# Patient Record
Sex: MALE | Race: WHITE | NOT HISPANIC OR LATINO | URBAN - METROPOLITAN AREA
[De-identification: names, ages, dates, MRNs, and addresses within clinical notes are randomized per-mention and may not be internally consistent; named-entity substitution may affect disease eponyms.]

---

## 2021-05-13 ENCOUNTER — TELEPHONE (OUTPATIENT)
Dept: NEUROLOGY | Facility: CLINIC | Age: 71
End: 2021-05-13

## 2021-07-22 ENCOUNTER — OFFICE VISIT (OUTPATIENT)
Dept: NEUROLOGY | Facility: CLINIC | Age: 71
End: 2021-07-22
Payer: MEDICARE

## 2021-07-22 VITALS — DIASTOLIC BLOOD PRESSURE: 89 MMHG | WEIGHT: 201.75 LBS | SYSTOLIC BLOOD PRESSURE: 154 MMHG | HEART RATE: 79 BPM

## 2021-07-22 DIAGNOSIS — G20.A1 PARKINSON'S DISEASE: Primary | ICD-10-CM

## 2021-07-22 PROCEDURE — 99204 OFFICE O/P NEW MOD 45 MIN: CPT | Mod: S$PBB,,, | Performed by: PSYCHIATRY & NEUROLOGY

## 2021-07-22 PROCEDURE — 99999 PR PBB SHADOW E&M-EST. PATIENT-LVL III: CPT | Mod: PBBFAC,,, | Performed by: PSYCHIATRY & NEUROLOGY

## 2021-07-22 PROCEDURE — 99213 OFFICE O/P EST LOW 20 MIN: CPT | Mod: PBBFAC | Performed by: PSYCHIATRY & NEUROLOGY

## 2021-07-22 PROCEDURE — 99204 PR OFFICE/OUTPT VISIT, NEW, LEVL IV, 45-59 MIN: ICD-10-PCS | Mod: S$PBB,,, | Performed by: PSYCHIATRY & NEUROLOGY

## 2021-07-22 PROCEDURE — 99999 PR PBB SHADOW E&M-EST. PATIENT-LVL III: ICD-10-PCS | Mod: PBBFAC,,, | Performed by: PSYCHIATRY & NEUROLOGY

## 2021-07-22 RX ORDER — BENZTROPINE MESYLATE 1 MG/1
1 TABLET ORAL 2 TIMES DAILY
COMMUNITY
Start: 2021-04-20

## 2021-07-22 RX ORDER — SERTRALINE HYDROCHLORIDE 50 MG/1
50 TABLET, FILM COATED ORAL NIGHTLY
COMMUNITY
Start: 2021-05-25 | End: 2022-05-25

## 2021-07-22 RX ORDER — ROPINIROLE 1 MG/1
TABLET, FILM COATED ORAL
COMMUNITY
Start: 2021-04-20

## 2021-07-22 RX ORDER — NAPROXEN SODIUM 220 MG/1
TABLET, FILM COATED ORAL
Status: ON HOLD | COMMUNITY
End: 2022-02-02 | Stop reason: HOSPADM

## 2021-07-22 RX ORDER — CARBIDOPA AND LEVODOPA 25; 100 MG/1; MG/1
TABLET ORAL
COMMUNITY
Start: 2021-05-25

## 2021-08-24 ENCOUNTER — TELEPHONE (OUTPATIENT)
Dept: NEUROSURGERY | Facility: CLINIC | Age: 71
End: 2021-08-24

## 2021-09-16 ENCOUNTER — TELEPHONE (OUTPATIENT)
Dept: NEUROSURGERY | Facility: CLINIC | Age: 71
End: 2021-09-16

## 2021-09-23 ENCOUNTER — OFFICE VISIT (OUTPATIENT)
Dept: NEUROSURGERY | Facility: CLINIC | Age: 71
End: 2021-09-23
Payer: MEDICARE

## 2021-09-23 DIAGNOSIS — G20.A1 PARKINSON'S DISEASE: ICD-10-CM

## 2021-09-23 PROCEDURE — 99205 OFFICE O/P NEW HI 60 MIN: CPT | Mod: 95,,, | Performed by: NEUROLOGICAL SURGERY

## 2021-09-23 PROCEDURE — 99205 PR OFFICE/OUTPT VISIT, NEW, LEVL V, 60-74 MIN: ICD-10-PCS | Mod: 95,,, | Performed by: NEUROLOGICAL SURGERY

## 2021-09-29 ENCOUNTER — OFFICE VISIT (OUTPATIENT)
Dept: NEUROLOGY | Facility: CLINIC | Age: 71
End: 2021-09-29

## 2021-09-29 DIAGNOSIS — F41.9 ANXIETY: ICD-10-CM

## 2021-09-29 DIAGNOSIS — R41.9 COGNITIVE COMPLAINTS: ICD-10-CM

## 2021-09-29 DIAGNOSIS — F32.A DEPRESSION, UNSPECIFIED DEPRESSION TYPE: Primary | ICD-10-CM

## 2021-09-29 DIAGNOSIS — G20.A1 PARKINSON'S DISEASE: ICD-10-CM

## 2021-09-29 PROCEDURE — 99499 NO LOS: ICD-10-PCS | Mod: 95,,, | Performed by: CLINICAL NEUROPSYCHOLOGIST

## 2021-09-29 PROCEDURE — 90791 PSYCH DIAGNOSTIC EVALUATION: CPT | Mod: 95,,, | Performed by: CLINICAL NEUROPSYCHOLOGIST

## 2021-09-29 PROCEDURE — 90791 PR PSYCHIATRIC DIAGNOSTIC EVALUATION: ICD-10-PCS | Mod: 95,,, | Performed by: CLINICAL NEUROPSYCHOLOGIST

## 2021-09-29 PROCEDURE — 99499 UNLISTED E&M SERVICE: CPT | Mod: 95,,, | Performed by: CLINICAL NEUROPSYCHOLOGIST

## 2021-10-04 ENCOUNTER — OFFICE VISIT (OUTPATIENT)
Dept: NEUROLOGY | Facility: CLINIC | Age: 71
End: 2021-10-04

## 2021-10-04 DIAGNOSIS — G31.84 MILD COGNITIVE IMPAIRMENT: Primary | ICD-10-CM

## 2021-10-04 DIAGNOSIS — G20.A1 PARKINSON'S DISEASE: ICD-10-CM

## 2021-10-04 DIAGNOSIS — F41.9 ANXIETY: ICD-10-CM

## 2021-10-04 DIAGNOSIS — F32.A DEPRESSION, UNSPECIFIED DEPRESSION TYPE: ICD-10-CM

## 2021-10-04 PROCEDURE — 99499 UNLISTED E&M SERVICE: CPT | Mod: S$PBB,,, | Performed by: CLINICAL NEUROPSYCHOLOGIST

## 2021-10-04 PROCEDURE — 99999 PR PBB SHADOW E&M-EST. PATIENT-LVL I: CPT | Mod: PBBFAC,,, | Performed by: CLINICAL NEUROPSYCHOLOGIST

## 2021-10-04 PROCEDURE — 99999 PR PBB SHADOW E&M-EST. PATIENT-LVL I: ICD-10-PCS | Mod: PBBFAC,,, | Performed by: CLINICAL NEUROPSYCHOLOGIST

## 2021-10-04 PROCEDURE — 96139 PSYCL/NRPSYC TST TECH EA: CPT | Mod: ,,, | Performed by: CLINICAL NEUROPSYCHOLOGIST

## 2021-10-04 PROCEDURE — 99211 OFF/OP EST MAY X REQ PHY/QHP: CPT | Mod: PBBFAC | Performed by: CLINICAL NEUROPSYCHOLOGIST

## 2021-10-04 PROCEDURE — 96138 PSYCL/NRPSYC TECH 1ST: CPT | Mod: ,,, | Performed by: CLINICAL NEUROPSYCHOLOGIST

## 2021-10-04 PROCEDURE — 99499 NO LOS: ICD-10-PCS | Mod: S$PBB,,, | Performed by: CLINICAL NEUROPSYCHOLOGIST

## 2021-10-04 PROCEDURE — 96133 NRPSYC TST EVAL PHYS/QHP EA: CPT | Mod: ,,, | Performed by: CLINICAL NEUROPSYCHOLOGIST

## 2021-10-04 PROCEDURE — 96132 NRPSYC TST EVAL PHYS/QHP 1ST: CPT | Mod: ,,, | Performed by: CLINICAL NEUROPSYCHOLOGIST

## 2021-10-04 PROCEDURE — 96138 PR PSYCH/NEUROPSYCH TEST ADMIN/SCORING, BY TECH, 2+ TESTS, 1ST 30 MIN: ICD-10-PCS | Mod: ,,, | Performed by: CLINICAL NEUROPSYCHOLOGIST

## 2021-10-04 PROCEDURE — 96139 PR PSYCH/NEUROPSYCH TEST ADMIN/SCORING, BY TECH, 2+ TESTS, EA ADDTL 30 MIN: ICD-10-PCS | Mod: ,,, | Performed by: CLINICAL NEUROPSYCHOLOGIST

## 2021-10-04 PROCEDURE — 96132 PR NEUROPSYCHOLOGIC TEST EVAL SVCS, 1ST HR: ICD-10-PCS | Mod: ,,, | Performed by: CLINICAL NEUROPSYCHOLOGIST

## 2021-10-04 PROCEDURE — 96133 PR NEUROPSYCHOLOGIC TEST EVAL SVCS, EA ADDTL HR: ICD-10-PCS | Mod: ,,, | Performed by: CLINICAL NEUROPSYCHOLOGIST

## 2021-10-07 ENCOUNTER — TELEPHONE (OUTPATIENT)
Dept: NEUROLOGY | Facility: CLINIC | Age: 71
End: 2021-10-07

## 2021-10-07 PROBLEM — G31.84 MILD COGNITIVE IMPAIRMENT: Status: ACTIVE | Noted: 2021-09-29

## 2021-10-08 ENCOUNTER — PATIENT MESSAGE (OUTPATIENT)
Dept: NEUROLOGY | Facility: CLINIC | Age: 71
End: 2021-10-08

## 2021-10-29 ENCOUNTER — TELEPHONE (OUTPATIENT)
Dept: NEUROLOGY | Facility: CLINIC | Age: 71
End: 2021-10-29
Payer: MEDICARE

## 2021-11-15 ENCOUNTER — TELEPHONE (OUTPATIENT)
Dept: NEUROLOGY | Facility: CLINIC | Age: 71
End: 2021-11-15
Payer: MEDICARE

## 2021-11-23 ENCOUNTER — DOCUMENTATION ONLY (OUTPATIENT)
Dept: NEUROLOGY | Facility: CLINIC | Age: 71
End: 2021-11-23
Payer: MEDICARE

## 2021-11-24 ENCOUNTER — TELEPHONE (OUTPATIENT)
Dept: NEUROLOGY | Facility: CLINIC | Age: 71
End: 2021-11-24
Payer: MEDICARE

## 2021-12-08 ENCOUNTER — DOCUMENTATION ONLY (OUTPATIENT)
Dept: NEUROLOGY | Facility: CLINIC | Age: 71
End: 2021-12-08
Payer: MEDICARE

## 2021-12-09 ENCOUNTER — DOCUMENTATION ONLY (OUTPATIENT)
Dept: NEUROLOGY | Facility: CLINIC | Age: 71
End: 2021-12-09
Payer: MEDICARE

## 2021-12-13 ENCOUNTER — TELEPHONE (OUTPATIENT)
Dept: NEUROSURGERY | Facility: CLINIC | Age: 71
End: 2021-12-13

## 2021-12-13 ENCOUNTER — OFFICE VISIT (OUTPATIENT)
Dept: NEUROSURGERY | Facility: CLINIC | Age: 71
End: 2021-12-13
Payer: MEDICARE

## 2021-12-13 VITALS
HEART RATE: 79 BPM | TEMPERATURE: 99 F | BODY MASS INDEX: 30.46 KG/M2 | DIASTOLIC BLOOD PRESSURE: 90 MMHG | HEIGHT: 68 IN | SYSTOLIC BLOOD PRESSURE: 141 MMHG | WEIGHT: 201 LBS

## 2021-12-13 DIAGNOSIS — G20.A1 PARKINSON'S DISEASE: Primary | ICD-10-CM

## 2021-12-13 PROCEDURE — 99214 OFFICE O/P EST MOD 30 MIN: CPT | Mod: S$PBB,,, | Performed by: NEUROLOGICAL SURGERY

## 2021-12-13 PROCEDURE — 99999 PR PBB SHADOW E&M-EST. PATIENT-LVL III: CPT | Mod: PBBFAC,,, | Performed by: NEUROLOGICAL SURGERY

## 2021-12-13 PROCEDURE — 99213 OFFICE O/P EST LOW 20 MIN: CPT | Mod: PBBFAC | Performed by: NEUROLOGICAL SURGERY

## 2021-12-13 PROCEDURE — 99999 PR PBB SHADOW E&M-EST. PATIENT-LVL III: ICD-10-PCS | Mod: PBBFAC,,, | Performed by: NEUROLOGICAL SURGERY

## 2021-12-13 PROCEDURE — 99214 PR OFFICE/OUTPT VISIT, EST, LEVL IV, 30-39 MIN: ICD-10-PCS | Mod: S$PBB,,, | Performed by: NEUROLOGICAL SURGERY

## 2021-12-22 ENCOUNTER — DOCUMENTATION ONLY (OUTPATIENT)
Dept: NEUROLOGY | Facility: CLINIC | Age: 71
End: 2021-12-22
Payer: MEDICARE

## 2022-01-19 ENCOUNTER — HOSPITAL ENCOUNTER (OUTPATIENT)
Dept: RADIOLOGY | Facility: HOSPITAL | Age: 72
Discharge: HOME | End: 2022-01-19
Attending: NEUROLOGICAL SURGERY
Payer: MEDICARE

## 2022-01-19 DIAGNOSIS — G20.A1 PARKINSON'S DISEASE: ICD-10-CM

## 2022-01-19 PROCEDURE — 70553 MRI BRAIN STEM W/O & W/DYE: CPT | Mod: TC

## 2022-01-19 PROCEDURE — 25500020 PHARM REV CODE 255: Performed by: NEUROLOGICAL SURGERY

## 2022-01-19 PROCEDURE — 70553 MRI BRAIN STEM W/O & W/DYE: CPT | Mod: 26,,, | Performed by: RADIOLOGY

## 2022-01-19 PROCEDURE — 70553 MRI BRAIN W WO CONTRAST: ICD-10-PCS | Mod: 26,,, | Performed by: RADIOLOGY

## 2022-01-19 PROCEDURE — A9585 GADOBUTROL INJECTION: HCPCS | Performed by: NEUROLOGICAL SURGERY

## 2022-01-19 RX ORDER — GADOBUTROL 604.72 MG/ML
10 INJECTION INTRAVENOUS
Status: COMPLETED | OUTPATIENT
Start: 2022-01-19 | End: 2022-01-19

## 2022-01-19 RX ADMIN — GADOBUTROL 10 ML: 604.72 INJECTION INTRAVENOUS at 03:01

## 2022-01-31 ENCOUNTER — ANESTHESIA EVENT (OUTPATIENT)
Dept: SURGERY | Facility: HOSPITAL | Age: 72
DRG: 027 | End: 2022-01-31
Payer: MEDICARE

## 2022-01-31 NOTE — H&P (VIEW-ONLY)
[facility] ([facility])  Neurosurgery  Progress Note    Subjective:     History of Present Illness:  Richard Briceno was seen in neurosurgical consultation at the office this morning.  He is a 71-year-old man who first began to note tremor of the left upper extremity at rest about 7 years ago.  This progressed to involve the right upper extremity.  He was seen in neurological evaluation by Dr. Yan in [place] in 2015 and diagnosed with Parkinson's disease.  This has been managed with Sinemet, Requip, and Cogentin but in the last year to the medications have been less effective and he has almost constant resting tremor.  He has noted some warbling jand hypophonia of his voice.  He has experienced stiffness in the upper extremities but no specific difficulties initiating movement and he feels his gait and balance are reasonably good.  He works installing air conditioners on roof tops.  He has had a coronary artery stent placed and remains on aspirin.  He does have some intermittent shortness of breath.  Review of systems is generally otherwise unremarkable.         Post-Op Info:  Procedure(s) (LRB):  INSERTION, FIDUCIAL SCREW, SKULL FOR DBD (N/A)  INSERTION, DEEP BRAIN STIMULATOR (Bilateral)           Medications:  Continuous Infusions:  Scheduled Meds:  PRN Meds:     Review of Systems  Objective:      12 point ROS negative except as specified elsewhere   There is no height or weight on file to calculate BMI.  Vital Signs (Most Recent):    Vital Signs (24h Range):              Past Medical History:   Diagnosis Date    Anxiety     Coronary artery disease     Depression     Hypertension     Shortness of breath           Past Surgical History:   Procedure Laterality Date    CORONARY STENT PLACEMENT  07/2020    TONSILLECTOMY         Social History     Socioeconomic History    Marital status:    Tobacco Use    Smoking status: Never Smoker    Smokeless tobacco: Never Used   Substance and Sexual  Activity    Alcohol use: Yes     Alcohol/week: 50.0 standard drinks     Types: 50 Cans of beer per week         Neurosurgery Physical Exam  On physical examination he is a well-developed, stocky white male who is alert and cooperative.  Examination of the head is unremarkable.  There is a slight delay with extraocular movements but upper gaze is preserved and pupils are equal.  He is moving neck freely.  On neurological examination he is speaking appropriately but as noted has some mild tremor in the voice and hypophonia.  Understanding seems generally good.  He has marked oscillating tremor of both upper extremities at rest worse on the left side.  This is improved with finger-to-nose testing which is reasonably accurate.  There is bilateral cogwheel rigidity of the upper extremities again worse on the left.  Gait is essentially normal.  Cranial nerves otherwise intact.  He has normal facial sensation and movement and the tongue protrudes in midline.  He shows good strength extremities, normal sensation.     Impression:  Parkinson's disease, tremor dominant.     Significant Labs:  No results for input(s): GLU, NA, K, CL, CO2, BUN, CREATININE, CALCIUM, MG in the last 48 hours.  No results for input(s): WBC, HGB, HCT, PLT in the last 48 hours.  No results for input(s): LABPT, INR, APTT in the last 48 hours.  Microbiology Results (last 7 days)     ** No results found for the last 168 hours. **        All pertinent labs from the last 24 hours have been reviewed.    Significant Diagnostics:  I have reviewed all pertinent imaging results/findings within the past 24 hours.    Assessment/Plan:     Parkinson's disease     He will need bilateral implants DBS .  We will plan to start on the right side since his left extremities are more involved and then will either place the left-sided electrode at that time or as a second procedure.  We will plan to get him done the next few weeks            Davis Desai  MD  Neurosurgery  [facility] (2nd Fl)

## 2022-01-31 NOTE — SUBJECTIVE & OBJECTIVE
Interval History:      Richard Briceno was seen in neurosurgical consultation at the office this morning.  He is a 71-year-old man who first began to note tremor of the left upper extremity at rest about 7 years ago.  This progressed to involve the right upper extremity.  He was seen in neurological evaluation by Dr. Yan in [place] in 2015 and diagnosed with Parkinson's disease.  This has been managed with Sinemet, Requip, and Cogentin but in the last year to the medications have been less effective and he has almost constant resting tremor.  He has noted some warbling jand hypophonia of his voice.  He has experienced stiffness in the upper extremities but no specific difficulties initiating movement and he feels his gait and balance are reasonably good.  He works installing air conditioners on roof tops.  He has had a coronary artery stent placed and remains on aspirin.  He does have some intermittent shortness of breath.  Review of systems is generally otherwise unremarkable.          Medications:  Continuous Infusions:  Scheduled Meds:  PRN Meds:     Review of Systems  Objective:        There is no height or weight on file to calculate BMI.  Vital Signs (Most Recent):    Vital Signs (24h Range):                      Neurosurgery Physical Exam  On physical examination he is a well-developed, stocky white male who is alert and cooperative.  Examination of the head is unremarkable.  There is a slight delay with extraocular movements but upper gaze is preserved and pupils are equal.  He is moving neck freely.  On neurological examination he is speaking appropriately but as noted has some mild tremor in the voice and hypophonia.  Understanding seems generally good.  He has marked oscillating tremor of both upper extremities at rest worse on the left side.  This is improved with finger-to-nose testing which is reasonably accurate.  There is bilateral cogwheel rigidity of the upper extremities again worse on  the left.  Gait is essentially normal.  Cranial nerves otherwise intact.  He has normal facial sensation and movement and the tongue protrudes in midline.  He shows good strength extremities, normal sensation.     Impression:  Parkinson's disease, tremor dominant.     Significant Labs:  No results for input(s): GLU, NA, K, CL, CO2, BUN, CREATININE, CALCIUM, MG in the last 48 hours.  No results for input(s): WBC, HGB, HCT, PLT in the last 48 hours.  No results for input(s): LABPT, INR, APTT in the last 48 hours.  Microbiology Results (last 7 days)     ** No results found for the last 168 hours. **        All pertinent labs from the last 24 hours have been reviewed.    Significant Diagnostics:  I have reviewed all pertinent imaging results/findings within the past 24 hours.

## 2022-01-31 NOTE — PROGRESS NOTES
[facility] ([facility])  Neurosurgery  Progress Note    Subjective:     History of Present Illness:  Richard Briceno was seen in neurosurgical consultation at the office this morning.  He is a 71-year-old man who first began to note tremor of the left upper extremity at rest about 7 years ago.  This progressed to involve the right upper extremity.  He was seen in neurological evaluation by Dr. Yan in [place] in 2015 and diagnosed with Parkinson's disease.  This has been managed with Sinemet, Requip, and Cogentin but in the last year to the medications have been less effective and he has almost constant resting tremor.  He has noted some warbling jand hypophonia of his voice.  He has experienced stiffness in the upper extremities but no specific difficulties initiating movement and he feels his gait and balance are reasonably good.  He works installing air conditioners on roof tops.  He has had a coronary artery stent placed and remains on aspirin.  He does have some intermittent shortness of breath.  Review of systems is generally otherwise unremarkable.         Post-Op Info:  Procedure(s) (LRB):  INSERTION, FIDUCIAL SCREW, SKULL FOR DBD (N/A)  INSERTION, DEEP BRAIN STIMULATOR (Bilateral)           Medications:  Continuous Infusions:  Scheduled Meds:  PRN Meds:     Review of Systems  Objective:      12 point ROS negative except as specified elsewhere   There is no height or weight on file to calculate BMI.  Vital Signs (Most Recent):    Vital Signs (24h Range):              Past Medical History:   Diagnosis Date    Anxiety     Coronary artery disease     Depression     Hypertension     Shortness of breath           Past Surgical History:   Procedure Laterality Date    CORONARY STENT PLACEMENT  07/2020    TONSILLECTOMY         Social History     Socioeconomic History    Marital status:    Tobacco Use    Smoking status: Never Smoker    Smokeless tobacco: Never Used   Substance and Sexual  Activity    Alcohol use: Yes     Alcohol/week: 50.0 standard drinks     Types: 50 Cans of beer per week         Neurosurgery Physical Exam  On physical examination he is a well-developed, stocky white male who is alert and cooperative.  Examination of the head is unremarkable.  There is a slight delay with extraocular movements but upper gaze is preserved and pupils are equal.  He is moving neck freely.  On neurological examination he is speaking appropriately but as noted has some mild tremor in the voice and hypophonia.  Understanding seems generally good.  He has marked oscillating tremor of both upper extremities at rest worse on the left side.  This is improved with finger-to-nose testing which is reasonably accurate.  There is bilateral cogwheel rigidity of the upper extremities again worse on the left.  Gait is essentially normal.  Cranial nerves otherwise intact.  He has normal facial sensation and movement and the tongue protrudes in midline.  He shows good strength extremities, normal sensation.     Impression:  Parkinson's disease, tremor dominant.     Significant Labs:  No results for input(s): GLU, NA, K, CL, CO2, BUN, CREATININE, CALCIUM, MG in the last 48 hours.  No results for input(s): WBC, HGB, HCT, PLT in the last 48 hours.  No results for input(s): LABPT, INR, APTT in the last 48 hours.  Microbiology Results (last 7 days)     ** No results found for the last 168 hours. **        All pertinent labs from the last 24 hours have been reviewed.    Significant Diagnostics:  I have reviewed all pertinent imaging results/findings within the past 24 hours.    Assessment/Plan:     Parkinson's disease     He will need bilateral implants DBS .  We will plan to start on the right side since his left extremities are more involved and then will either place the left-sided electrode at that time or as a second procedure.  We will plan to get him done the next few weeks            Davis Desai  MD  Neurosurgery  [facility] (2nd Fl)

## 2022-01-31 NOTE — ASSESSMENT & PLAN NOTE
He will need bilateral implants DBS .  We will plan to start on the right side since his left extremities are more involved and then will either place the left-sided electrode at that time or as a second procedure.  We will plan to get him done the next few weeks

## 2022-01-31 NOTE — HPI
Richard Briceno was seen in neurosurgical consultation at the office this morning.  He is a 71-year-old man who first began to note tremor of the left upper extremity at rest about 7 years ago.  This progressed to involve the right upper extremity.  He was seen in neurological evaluation by Dr. Yan in [place] in 2015 and diagnosed with Parkinson's disease.  This has been managed with Sinemet, Requip, and Cogentin but in the last year to the medications have been less effective and he has almost constant resting tremor.  He has noted some warbling jand hypophonia of his voice.  He has experienced stiffness in the upper extremities but no specific difficulties initiating movement and he feels his gait and balance are reasonably good.  He works installing air conditioners on roof tops.  He has had a coronary artery stent placed and remains on aspirin.  He does have some intermittent shortness of breath.  Review of systems is generally otherwise unremarkable.

## 2022-02-01 ENCOUNTER — ANESTHESIA (OUTPATIENT)
Dept: SURGERY | Facility: HOSPITAL | Age: 72
DRG: 027 | End: 2022-02-01
Payer: MEDICARE

## 2022-02-01 ENCOUNTER — HOSPITAL ENCOUNTER (INPATIENT)
Facility: HOSPITAL | Age: 72
LOS: 1 days | Discharge: HOME | DRG: 027 | End: 2022-02-02
Attending: NEUROLOGICAL SURGERY | Admitting: NEUROLOGICAL SURGERY
Payer: MEDICARE

## 2022-02-01 DIAGNOSIS — G20.A1 PARKINSON'S DISEASE: ICD-10-CM

## 2022-02-01 LAB
ABO + RH BLD: NORMAL
ANION GAP SERPL CALC-SCNC: 7 MMOL/L (ref 8–16)
APTT BLDCRRT: 26.7 SEC (ref 21–32)
BASOPHILS # BLD AUTO: 0.04 K/UL (ref 0–0.2)
BASOPHILS NFR BLD: 0.5 % (ref 0–1.9)
BLD GP AB SCN CELLS X3 SERPL QL: NORMAL
BUN SERPL-MCNC: 20 MG/DL (ref 8–23)
CALCIUM SERPL-MCNC: 9.6 MG/DL (ref 8.7–10.5)
CHLORIDE SERPL-SCNC: 104 MMOL/L (ref 95–110)
CO2 SERPL-SCNC: 28 MMOL/L (ref 23–29)
CREAT SERPL-MCNC: 0.8 MG/DL (ref 0.5–1.4)
CTP QC/QA: YES
DIFFERENTIAL METHOD: ABNORMAL
EOSINOPHIL # BLD AUTO: 0.5 K/UL (ref 0–0.5)
EOSINOPHIL NFR BLD: 6.2 % (ref 0–8)
ERYTHROCYTE [DISTWIDTH] IN BLOOD BY AUTOMATED COUNT: 11.6 % (ref 11.5–14.5)
EST. GFR  (AFRICAN AMERICAN): >60 ML/MIN/1.73 M^2
EST. GFR  (NON AFRICAN AMERICAN): >60 ML/MIN/1.73 M^2
GLUCOSE SERPL-MCNC: 109 MG/DL (ref 70–110)
HCT VFR BLD AUTO: 44.8 % (ref 40–54)
HGB BLD-MCNC: 15.5 G/DL (ref 14–18)
IMM GRANULOCYTES # BLD AUTO: 0.04 K/UL (ref 0–0.04)
IMM GRANULOCYTES NFR BLD AUTO: 0.5 % (ref 0–0.5)
INR PPP: 1.1 (ref 0.8–1.2)
LYMPHOCYTES # BLD AUTO: 3.2 K/UL (ref 1–4.8)
LYMPHOCYTES NFR BLD: 38.9 % (ref 18–48)
MCH RBC QN AUTO: 34.4 PG (ref 27–31)
MCHC RBC AUTO-ENTMCNC: 34.6 G/DL (ref 32–36)
MCV RBC AUTO: 99 FL (ref 82–98)
MONOCYTES # BLD AUTO: 1.3 K/UL (ref 0.3–1)
MONOCYTES NFR BLD: 16 % (ref 4–15)
NEUTROPHILS # BLD AUTO: 3.1 K/UL (ref 1.8–7.7)
NEUTROPHILS NFR BLD: 37.9 % (ref 38–73)
NRBC BLD-RTO: 0 /100 WBC
PLATELET # BLD AUTO: 174 K/UL (ref 150–450)
PMV BLD AUTO: 11 FL (ref 9.2–12.9)
POTASSIUM SERPL-SCNC: 3.9 MMOL/L (ref 3.5–5.1)
PROTHROMBIN TIME: 11.7 SEC (ref 9–12.5)
RBC # BLD AUTO: 4.51 M/UL (ref 4.6–6.2)
SARS-COV-2 AG RESP QL IA.RAPID: NEGATIVE
SODIUM SERPL-SCNC: 139 MMOL/L (ref 136–145)
WBC # BLD AUTO: 8.27 K/UL (ref 3.9–12.7)

## 2022-02-01 PROCEDURE — 36000711: Performed by: NEUROLOGICAL SURGERY

## 2022-02-01 PROCEDURE — 36415 COLL VENOUS BLD VENIPUNCTURE: CPT | Performed by: NEUROLOGICAL SURGERY

## 2022-02-01 PROCEDURE — 94761 N-INVAS EAR/PLS OXIMETRY MLT: CPT

## 2022-02-01 PROCEDURE — C1713 ANCHOR/SCREW BN/BN,TIS/BN: HCPCS | Performed by: NEUROLOGICAL SURGERY

## 2022-02-01 PROCEDURE — 25000003 PHARM REV CODE 250: Performed by: NEUROLOGICAL SURGERY

## 2022-02-01 PROCEDURE — 37000008 HC ANESTHESIA 1ST 15 MINUTES: Performed by: NEUROLOGICAL SURGERY

## 2022-02-01 PROCEDURE — 11000001 HC ACUTE MED/SURG PRIVATE ROOM

## 2022-02-01 PROCEDURE — 63600175 PHARM REV CODE 636 W HCPCS: Performed by: NEUROLOGICAL SURGERY

## 2022-02-01 PROCEDURE — 63600175 PHARM REV CODE 636 W HCPCS: Performed by: STUDENT IN AN ORGANIZED HEALTH CARE EDUCATION/TRAINING PROGRAM

## 2022-02-01 PROCEDURE — D9220A PRA ANESTHESIA: ICD-10-PCS | Mod: ,,, | Performed by: ANESTHESIOLOGY

## 2022-02-01 PROCEDURE — 25000003 PHARM REV CODE 250: Performed by: STUDENT IN AN ORGANIZED HEALTH CARE EDUCATION/TRAINING PROGRAM

## 2022-02-01 PROCEDURE — 27201423 OPTIME MED/SURG SUP & DEVICES STERILE SUPPLY: Performed by: NEUROLOGICAL SURGERY

## 2022-02-01 PROCEDURE — 61867 IMPLANT NEUROELECTRODE: CPT | Mod: 62,,, | Performed by: NEUROLOGICAL SURGERY

## 2022-02-01 PROCEDURE — 61868 IMPLANT NEUROELECTRDE ADDL: CPT | Mod: 62,,, | Performed by: NEUROLOGICAL SURGERY

## 2022-02-01 PROCEDURE — D9220A PRA ANESTHESIA: Mod: ,,, | Performed by: ANESTHESIOLOGY

## 2022-02-01 PROCEDURE — 61868 PR IMPLANT NEUROELECTRDE, ADDÆL: ICD-10-PCS | Mod: 62,,, | Performed by: NEUROLOGICAL SURGERY

## 2022-02-01 PROCEDURE — 85730 THROMBOPLASTIN TIME PARTIAL: CPT | Performed by: STUDENT IN AN ORGANIZED HEALTH CARE EDUCATION/TRAINING PROGRAM

## 2022-02-01 PROCEDURE — 71000016 HC POSTOP RECOV ADDL HR: Performed by: NEUROLOGICAL SURGERY

## 2022-02-01 PROCEDURE — 27000221 HC OXYGEN, UP TO 24 HOURS

## 2022-02-01 PROCEDURE — 85025 COMPLETE CBC W/AUTO DIFF WBC: CPT | Performed by: STUDENT IN AN ORGANIZED HEALTH CARE EDUCATION/TRAINING PROGRAM

## 2022-02-01 PROCEDURE — 86850 RBC ANTIBODY SCREEN: CPT | Performed by: NEUROLOGICAL SURGERY

## 2022-02-01 PROCEDURE — 71000033 HC RECOVERY, INTIAL HOUR: Performed by: NEUROLOGICAL SURGERY

## 2022-02-01 PROCEDURE — 71000015 HC POSTOP RECOV 1ST HR: Performed by: NEUROLOGICAL SURGERY

## 2022-02-01 PROCEDURE — C1778 LEAD, NEUROSTIMULATOR: HCPCS | Performed by: NEUROLOGICAL SURGERY

## 2022-02-01 PROCEDURE — 37000009 HC ANESTHESIA EA ADD 15 MINS: Performed by: NEUROLOGICAL SURGERY

## 2022-02-01 PROCEDURE — 36000710: Performed by: NEUROLOGICAL SURGERY

## 2022-02-01 PROCEDURE — 85610 PROTHROMBIN TIME: CPT | Performed by: STUDENT IN AN ORGANIZED HEALTH CARE EDUCATION/TRAINING PROGRAM

## 2022-02-01 PROCEDURE — 80048 BASIC METABOLIC PNL TOTAL CA: CPT | Performed by: STUDENT IN AN ORGANIZED HEALTH CARE EDUCATION/TRAINING PROGRAM

## 2022-02-01 PROCEDURE — 61867 PR IMPLANT NEUROELECTRODE W/ RECORDING: ICD-10-PCS | Mod: 62,,, | Performed by: NEUROLOGICAL SURGERY

## 2022-02-01 PROCEDURE — A4648 IMPLANTABLE TISSUE MARKER: HCPCS | Performed by: NEUROLOGICAL SURGERY

## 2022-02-01 DEVICE — IMPLANTABLE DEVICE: Type: IMPLANTABLE DEVICE | Site: CRANIAL | Status: FUNCTIONAL

## 2022-02-01 RX ORDER — PROPOFOL 10 MG/ML
VIAL (ML) INTRAVENOUS
Status: DISCONTINUED | OUTPATIENT
Start: 2022-02-01 | End: 2022-02-01

## 2022-02-01 RX ORDER — LABETALOL HYDROCHLORIDE 5 MG/ML
INJECTION, SOLUTION INTRAVENOUS
Status: DISCONTINUED | OUTPATIENT
Start: 2022-02-01 | End: 2022-02-01

## 2022-02-01 RX ORDER — BUPIVACAINE HYDROCHLORIDE AND EPINEPHRINE 5; 5 MG/ML; UG/ML
INJECTION, SOLUTION EPIDURAL; INTRACAUDAL; PERINEURAL
Status: DISCONTINUED | OUTPATIENT
Start: 2022-02-01 | End: 2022-02-01 | Stop reason: HOSPADM

## 2022-02-01 RX ORDER — MUPIROCIN 20 MG/G
OINTMENT TOPICAL
Status: DISCONTINUED | OUTPATIENT
Start: 2022-02-01 | End: 2022-02-01 | Stop reason: HOSPADM

## 2022-02-01 RX ORDER — ONDANSETRON 2 MG/ML
4 INJECTION INTRAMUSCULAR; INTRAVENOUS DAILY PRN
Status: DISCONTINUED | OUTPATIENT
Start: 2022-02-01 | End: 2022-02-01 | Stop reason: HOSPADM

## 2022-02-01 RX ORDER — ROPINIROLE 1 MG/1
1 TABLET, FILM COATED ORAL NIGHTLY
Status: DISCONTINUED | OUTPATIENT
Start: 2022-02-01 | End: 2022-02-02 | Stop reason: HOSPADM

## 2022-02-01 RX ORDER — NICARDIPINE HYDROCHLORIDE 2.5 MG/ML
INJECTION INTRAVENOUS
Status: DISCONTINUED | OUTPATIENT
Start: 2022-02-01 | End: 2022-02-01

## 2022-02-01 RX ORDER — BACITRACIN ZINC 500 UNIT/G
OINTMENT (GRAM) TOPICAL
Status: DISCONTINUED | OUTPATIENT
Start: 2022-02-01 | End: 2022-02-01 | Stop reason: HOSPADM

## 2022-02-01 RX ORDER — MUPIROCIN 20 MG/G
1 OINTMENT TOPICAL 2 TIMES DAILY
Status: DISCONTINUED | OUTPATIENT
Start: 2022-02-01 | End: 2022-02-01 | Stop reason: HOSPADM

## 2022-02-01 RX ORDER — ROPINIROLE 0.5 MG/1
0.5 TABLET, FILM COATED ORAL 2 TIMES DAILY
Status: DISCONTINUED | OUTPATIENT
Start: 2022-02-01 | End: 2022-02-01

## 2022-02-01 RX ORDER — LIDOCAINE HYDROCHLORIDE AND EPINEPHRINE 10; 10 MG/ML; UG/ML
INJECTION, SOLUTION INFILTRATION; PERINEURAL
Status: DISCONTINUED | OUTPATIENT
Start: 2022-02-01 | End: 2022-02-01 | Stop reason: HOSPADM

## 2022-02-01 RX ORDER — ACETAMINOPHEN 325 MG/1
650 TABLET ORAL EVERY 4 HOURS PRN
Status: DISCONTINUED | OUTPATIENT
Start: 2022-02-01 | End: 2022-02-02 | Stop reason: HOSPADM

## 2022-02-01 RX ORDER — FENTANYL CITRATE 50 UG/ML
25 INJECTION, SOLUTION INTRAMUSCULAR; INTRAVENOUS EVERY 5 MIN PRN
Status: DISCONTINUED | OUTPATIENT
Start: 2022-02-01 | End: 2022-02-01 | Stop reason: HOSPADM

## 2022-02-01 RX ORDER — ONDANSETRON 8 MG/1
8 TABLET, ORALLY DISINTEGRATING ORAL EVERY 8 HOURS PRN
Status: DISCONTINUED | OUTPATIENT
Start: 2022-02-01 | End: 2022-02-02 | Stop reason: HOSPADM

## 2022-02-01 RX ORDER — HEPARIN SODIUM 5000 [USP'U]/ML
5000 INJECTION, SOLUTION INTRAVENOUS; SUBCUTANEOUS EVERY 8 HOURS
Status: DISCONTINUED | OUTPATIENT
Start: 2022-02-02 | End: 2022-02-02 | Stop reason: HOSPADM

## 2022-02-01 RX ORDER — NICARDIPINE HYDROCHLORIDE 0.2 MG/ML
INJECTION INTRAVENOUS CONTINUOUS PRN
Status: DISCONTINUED | OUTPATIENT
Start: 2022-02-01 | End: 2022-02-01

## 2022-02-01 RX ORDER — BENZTROPINE MESYLATE 0.5 MG/1
1 TABLET ORAL 2 TIMES DAILY
Status: DISCONTINUED | OUTPATIENT
Start: 2022-02-01 | End: 2022-02-02 | Stop reason: HOSPADM

## 2022-02-01 RX ORDER — PROPOFOL 10 MG/ML
INJECTION, EMULSION INTRAVENOUS CONTINUOUS PRN
Status: DISCONTINUED | OUTPATIENT
Start: 2022-02-01 | End: 2022-02-01

## 2022-02-01 RX ORDER — SERTRALINE HYDROCHLORIDE 50 MG/1
50 TABLET, FILM COATED ORAL DAILY
Status: DISCONTINUED | OUTPATIENT
Start: 2022-02-01 | End: 2022-02-02 | Stop reason: HOSPADM

## 2022-02-01 RX ORDER — ROPINIROLE 0.25 MG/1
0.5 TABLET, FILM COATED ORAL DAILY
Status: DISCONTINUED | OUTPATIENT
Start: 2022-02-02 | End: 2022-02-02 | Stop reason: HOSPADM

## 2022-02-01 RX ORDER — CARBIDOPA AND LEVODOPA 25; 100 MG/1; MG/1
2 TABLET ORAL 3 TIMES DAILY
Status: DISCONTINUED | OUTPATIENT
Start: 2022-02-01 | End: 2022-02-02 | Stop reason: HOSPADM

## 2022-02-01 RX ORDER — PHENYLEPHRINE HYDROCHLORIDE 10 MG/ML
INJECTION INTRAVENOUS
Status: DISCONTINUED | OUTPATIENT
Start: 2022-02-01 | End: 2022-02-01

## 2022-02-01 RX ORDER — CEFTRIAXONE 1 G/1
INJECTION, POWDER, FOR SOLUTION INTRAMUSCULAR; INTRAVENOUS
Status: DISCONTINUED | OUTPATIENT
Start: 2022-02-01 | End: 2022-02-01 | Stop reason: HOSPADM

## 2022-02-01 RX ORDER — CARBIDOPA AND LEVODOPA 25; 100 MG/1; MG/1
2 TABLET ORAL 3 TIMES DAILY
Status: DISCONTINUED | OUTPATIENT
Start: 2022-02-01 | End: 2022-02-01

## 2022-02-01 RX ORDER — OXYCODONE HYDROCHLORIDE 10 MG/1
10 TABLET ORAL EVERY 4 HOURS PRN
Status: DISCONTINUED | OUTPATIENT
Start: 2022-02-01 | End: 2022-02-02 | Stop reason: HOSPADM

## 2022-02-01 RX ADMIN — PROPOFOL 150 MG: 10 INJECTION, EMULSION INTRAVENOUS at 07:02

## 2022-02-01 RX ADMIN — LABETALOL HYDROCHLORIDE 10 MG: 5 INJECTION, SOLUTION INTRAVENOUS at 10:02

## 2022-02-01 RX ADMIN — MUPIROCIN: 20 OINTMENT TOPICAL at 06:02

## 2022-02-01 RX ADMIN — CEFTRIAXONE 2 G: 2 INJECTION, SOLUTION INTRAVENOUS at 10:02

## 2022-02-01 RX ADMIN — PHENYLEPHRINE HYDROCHLORIDE 100 MCG: 10 INJECTION INTRAVENOUS at 08:02

## 2022-02-01 RX ADMIN — PROPOFOL 150 MCG/KG/MIN: 10 INJECTION, EMULSION INTRAVENOUS at 08:02

## 2022-02-01 RX ADMIN — BENZTROPINE MESYLATE 1 MG: 0.5 TABLET ORAL at 08:02

## 2022-02-01 RX ADMIN — PHENYLEPHRINE HYDROCHLORIDE 100 MCG: 10 INJECTION INTRAVENOUS at 09:02

## 2022-02-01 RX ADMIN — PROPOFOL 30 MG: 10 INJECTION, EMULSION INTRAVENOUS at 07:02

## 2022-02-01 RX ADMIN — OXYCODONE HYDROCHLORIDE 10 MG: 10 TABLET ORAL at 08:02

## 2022-02-01 RX ADMIN — PROPOFOL 50 MG: 10 INJECTION, EMULSION INTRAVENOUS at 07:02

## 2022-02-01 RX ADMIN — FENTANYL CITRATE 25 MCG: 50 INJECTION INTRAMUSCULAR; INTRAVENOUS at 01:02

## 2022-02-01 RX ADMIN — CARBIDOPA AND LEVODOPA 2 TABLET: 25; 100 TABLET ORAL at 04:02

## 2022-02-01 RX ADMIN — ROPINIROLE HYDROCHLORIDE 1 MG: 1 TABLET, FILM COATED ORAL at 08:02

## 2022-02-01 RX ADMIN — LABETALOL HYDROCHLORIDE 10 MG: 5 INJECTION, SOLUTION INTRAVENOUS at 09:02

## 2022-02-01 RX ADMIN — NICARDIPINE HYDROCHLORIDE 400 MCG: 2.5 INJECTION INTRAVENOUS at 10:02

## 2022-02-01 RX ADMIN — NICARDIPINE HYDROCHLORIDE 2.5 MG/HR: 0.2 INJECTION, SOLUTION INTRAVENOUS at 10:02

## 2022-02-01 RX ADMIN — PHENYLEPHRINE HYDROCHLORIDE 100 MCG: 10 INJECTION INTRAVENOUS at 10:02

## 2022-02-01 RX ADMIN — REMIFENTANIL HYDROCHLORIDE 0.05 MCG/KG/MIN: 1 INJECTION, POWDER, LYOPHILIZED, FOR SOLUTION INTRAVENOUS at 07:02

## 2022-02-01 NOTE — ANESTHESIA PROCEDURE NOTES
Intubation    Date/Time: 2/1/2022 8:02 AM  Performed by: Serena Sharif MD  Authorized by: Edy Salas MD     Intubation:     Induction:  Intravenous    Intubated:  Postinduction    Mask Ventilation:  Moderately difficult with oral airway    Attempts:  1    Attempted By:  Staff anesthesiologist    Method of Intubation:  Video laryngoscopy    Blade:  [place] 3    Laryngeal View Grade: Grade I - full view of cords      Difficult Airway Encountered?: No      Complications:  None    Airway Device:  Oral endotracheal tube    Airway Device Size:  7.5    Style/Cuff Inflation:  Cuffed    Tube secured:  23    Secured at:  The lips    Placement Verified By:  Capnometry    Complicating Factors:  None    Findings Post-Intubation:  BS equal bilateral

## 2022-02-01 NOTE — NURSING TRANSFER
Nursing Transfer Note      2/1/2022     Reason patient is being transferred: admitted     Transfer To: 960    Transfer via stretcher    Transfer with na    Transported by pct    Medicines sent: na     Any special needs or follow-up needed: CT on the way to his room     Chart send with patient: Yes    Notified: brother

## 2022-02-01 NOTE — PLAN OF CARE
Problem: Adult Inpatient Plan of Care  Goal: Plan of Care Review  Outcome: Ongoing, Progressing  Flowsheets (Taken 2/1/2022 1741)  Plan of Care Reviewed With:   patient   family  Goal: Patient-Specific Goal (Individualized)  Outcome: Ongoing, Progressing  Flowsheets (Taken 2/1/2022 1741)  Anxieties, Fears or Concerns: none  Individualized Care Needs: brother at bedside  Patient-Specific Goals (Include Timeframe): none     POC reviewed with patient. Patient verbalized understanding of POC. All comments and concerns addressed. Bed locked and low. Bed alarm set, Call light within reach. Safety precautions maintained. Will continue to monitor for changes to poc and clinical conditions.

## 2022-02-01 NOTE — ANESTHESIA PREPROCEDURE EVALUATION
[facility]  Anesthesia Pre-Operative Evaluation         Patient Name: Richard Briceno Sr.  YOB: 1950  MRN: [id number]    SUBJECTIVE:     Pre-operative evaluation for Procedure(s) (LRB):  INSERTION, FIDUCIAL SCREW, SKULL FOR DBD (N/A)  INSERTION, DEEP BRAIN STIMULATOR (Bilateral)     02/01/2022    Richard Briceno Sr. is a 71 y.o. male w/ a significant PMHx of .    Patient now presents for the above procedure(s).      LDA: None documented.       Peripheral IV - Single Lumen 02/01/22 0559 18 G Anterior;Left Forearm (Active)   Site Assessment Clean;Dry;Intact;No redness;No swelling 02/01/22 0559   Extremity Assessment Distal to IV No warmth;No swelling;No redness;No abnormal discoloration 02/01/22 0559   Line Status Blood return noted;Flushed;Infusing 02/01/22 0559   Dressing Status Clean;Dry;Intact 02/01/22 0559   Dressing Intervention First dressing 02/01/22 0559   Number of days: 0        Prev airway: None documented.    Drips: None documented.       Patient Active Problem List   Diagnosis    Parkinson's disease    Mild cognitive impairment    Anxiety    Depression       Review of patient's allergies indicates:  No Known Allergies    Current Outpatient Medications:    Current Facility-Administered Medications:     cefTRIAXone (ROCEPHIN) 2 g/50 mL D5W IVPB, 2 g, Intravenous, On Call Procedure, Davis Desai MD    mupirocin 2 % ointment 1 g, 1 g, Nasal, BID, Davis Desai MD    mupirocin 2 % ointment, , Nasal, On Call Procedure, Davis Desai MD, Given at 02/01/22 0602    Past Surgical History:   Procedure Laterality Date    CORONARY STENT PLACEMENT  07/2020    TONSILLECTOMY         Social History     Socioeconomic History    Marital status:    Tobacco Use    Smoking status: Never Smoker    Smokeless tobacco: Never Used   Substance and Sexual Activity    Alcohol use: Yes     Alcohol/week: 50.0 standard drinks     Types: 50 Cans of beer per week       OBJECTIVE:      Vital Signs Range (Last 24H):  Temp:  [37 °C (98.6 °F)]   Pulse:  [81]   Resp:  [17]   BP: (148)/(85)   SpO2:  [96 %]       Significant Labs:  No results found for: WBC, HGB, HCT, PLT, CHOL, TRIG, HDL, LDLDIRECT, ALT, AST, NA, K, CL, CREATININE, BUN, CO2, TSH, PSA, INR, GLUF, HGBA1C, MICROALBUR    Diagnostic Studies: No relevant studies.    EKG:   No results found for this or any previous visit.    2D ECHO:  TTE:  No results found for this or any previous visit.    GAURAV:  No results found for this or any previous visit.    ASSESSMENT/PLAN:         Anesthesia Evaluation    I have reviewed the Patient Summary Reports.      I have reviewed the Medications.     Review of Systems  Cardiovascular:   Hypertension CAD      Pulmonary:  Pulmonary Normal    Neurological:   Neuromuscular Disease,    Psych:   Psychiatric History          Physical Exam  General:  Well nourished, Obesity    Airway/Jaw/Neck:  Airway Findings: Mallampati: II      Chest/Lungs:  Chest/Lungs Findings: Clear to auscultation     Heart/Vascular:  Heart Findings: Rate: Normal  Rhythm: Regular Rhythm        Mental Status:  Mental Status Findings:  Alert and Oriented         Anesthesia Plan  Type of Anesthesia, risks & benefits discussed:  Anesthesia Type:  MAC    Patient's Preference:   Plan Factors:          Intra-op Monitoring Plan: standard ASA monitors  Intra-op Monitoring Plan Comments:   Post Op Pain Control Plan: multimodal analgesia, IV/PO Opioids PRN and per primary service following discharge from PACU  Post Op Pain Control Plan Comments:     Induction:   IV  Beta Blocker:         Informed Consent: Patient understands risks and agrees with Anesthesia plan.  Questions answered. Anesthesia consent signed with patient.  ASA Score: 3     Day of Surgery Review of History & Physical:            Ready For Surgery From Anesthesia Perspective.

## 2022-02-01 NOTE — PROGRESS NOTES
Patients home med administered per brother per Dr. Carl request. X-ray at bedside completing imaging. NAD noted. VSS. Safety maintained.

## 2022-02-02 VITALS
WEIGHT: 201.06 LBS | HEIGHT: 68 IN | OXYGEN SATURATION: 94 % | TEMPERATURE: 100 F | HEART RATE: 74 BPM | DIASTOLIC BLOOD PRESSURE: 66 MMHG | SYSTOLIC BLOOD PRESSURE: 125 MMHG | BODY MASS INDEX: 30.47 KG/M2 | RESPIRATION RATE: 18 BRPM

## 2022-02-02 LAB
ANION GAP SERPL CALC-SCNC: 6 MMOL/L (ref 8–16)
BASOPHILS # BLD AUTO: 0.02 K/UL (ref 0–0.2)
BASOPHILS NFR BLD: 0.2 % (ref 0–1.9)
BUN SERPL-MCNC: 15 MG/DL (ref 8–23)
CALCIUM SERPL-MCNC: 8.8 MG/DL (ref 8.7–10.5)
CHLORIDE SERPL-SCNC: 103 MMOL/L (ref 95–110)
CO2 SERPL-SCNC: 28 MMOL/L (ref 23–29)
CREAT SERPL-MCNC: 0.7 MG/DL (ref 0.5–1.4)
DIFFERENTIAL METHOD: ABNORMAL
EOSINOPHIL # BLD AUTO: 0.1 K/UL (ref 0–0.5)
EOSINOPHIL NFR BLD: 0.8 % (ref 0–8)
ERYTHROCYTE [DISTWIDTH] IN BLOOD BY AUTOMATED COUNT: 11.4 % (ref 11.5–14.5)
EST. GFR  (AFRICAN AMERICAN): >60 ML/MIN/1.73 M^2
EST. GFR  (NON AFRICAN AMERICAN): >60 ML/MIN/1.73 M^2
GLUCOSE SERPL-MCNC: 138 MG/DL (ref 70–110)
HCT VFR BLD AUTO: 39.6 % (ref 40–54)
HGB BLD-MCNC: 13.6 G/DL (ref 14–18)
IMM GRANULOCYTES # BLD AUTO: 0.03 K/UL (ref 0–0.04)
IMM GRANULOCYTES NFR BLD AUTO: 0.3 % (ref 0–0.5)
LYMPHOCYTES # BLD AUTO: 1.8 K/UL (ref 1–4.8)
LYMPHOCYTES NFR BLD: 16.8 % (ref 18–48)
MAGNESIUM SERPL-MCNC: 1.8 MG/DL (ref 1.6–2.6)
MCH RBC QN AUTO: 33.7 PG (ref 27–31)
MCHC RBC AUTO-ENTMCNC: 34.3 G/DL (ref 32–36)
MCV RBC AUTO: 98 FL (ref 82–98)
MONOCYTES # BLD AUTO: 1.7 K/UL (ref 0.3–1)
MONOCYTES NFR BLD: 15.6 % (ref 4–15)
NEUTROPHILS # BLD AUTO: 7 K/UL (ref 1.8–7.7)
NEUTROPHILS NFR BLD: 66.3 % (ref 38–73)
NRBC BLD-RTO: 0 /100 WBC
PHOSPHATE SERPL-MCNC: 3.1 MG/DL (ref 2.7–4.5)
PLATELET # BLD AUTO: 140 K/UL (ref 150–450)
PMV BLD AUTO: 11 FL (ref 9.2–12.9)
POTASSIUM SERPL-SCNC: 3.8 MMOL/L (ref 3.5–5.1)
RBC # BLD AUTO: 4.04 M/UL (ref 4.6–6.2)
SODIUM SERPL-SCNC: 137 MMOL/L (ref 136–145)
WBC # BLD AUTO: 10.61 K/UL (ref 3.9–12.7)

## 2022-02-02 PROCEDURE — 97165 OT EVAL LOW COMPLEX 30 MIN: CPT

## 2022-02-02 PROCEDURE — 25000003 PHARM REV CODE 250: Performed by: STUDENT IN AN ORGANIZED HEALTH CARE EDUCATION/TRAINING PROGRAM

## 2022-02-02 PROCEDURE — 84100 ASSAY OF PHOSPHORUS: CPT | Performed by: STUDENT IN AN ORGANIZED HEALTH CARE EDUCATION/TRAINING PROGRAM

## 2022-02-02 PROCEDURE — 99024 PR POST-OP FOLLOW-UP VISIT: ICD-10-PCS | Mod: POP,,, | Performed by: PHYSICIAN ASSISTANT

## 2022-02-02 PROCEDURE — 80048 BASIC METABOLIC PNL TOTAL CA: CPT | Performed by: STUDENT IN AN ORGANIZED HEALTH CARE EDUCATION/TRAINING PROGRAM

## 2022-02-02 PROCEDURE — 25000242 PHARM REV CODE 250 ALT 637 W/ HCPCS: Performed by: PHYSICIAN ASSISTANT

## 2022-02-02 PROCEDURE — 83735 ASSAY OF MAGNESIUM: CPT | Performed by: STUDENT IN AN ORGANIZED HEALTH CARE EDUCATION/TRAINING PROGRAM

## 2022-02-02 PROCEDURE — 63600175 PHARM REV CODE 636 W HCPCS: Performed by: STUDENT IN AN ORGANIZED HEALTH CARE EDUCATION/TRAINING PROGRAM

## 2022-02-02 PROCEDURE — 97162 PT EVAL MOD COMPLEX 30 MIN: CPT

## 2022-02-02 PROCEDURE — 99024 POSTOP FOLLOW-UP VISIT: CPT | Mod: POP,,, | Performed by: PHYSICIAN ASSISTANT

## 2022-02-02 PROCEDURE — 94761 N-INVAS EAR/PLS OXIMETRY MLT: CPT

## 2022-02-02 PROCEDURE — 94640 AIRWAY INHALATION TREATMENT: CPT

## 2022-02-02 PROCEDURE — 36415 COLL VENOUS BLD VENIPUNCTURE: CPT | Performed by: STUDENT IN AN ORGANIZED HEALTH CARE EDUCATION/TRAINING PROGRAM

## 2022-02-02 PROCEDURE — 85025 COMPLETE CBC W/AUTO DIFF WBC: CPT | Performed by: STUDENT IN AN ORGANIZED HEALTH CARE EDUCATION/TRAINING PROGRAM

## 2022-02-02 PROCEDURE — 97530 THERAPEUTIC ACTIVITIES: CPT

## 2022-02-02 PROCEDURE — 97110 THERAPEUTIC EXERCISES: CPT

## 2022-02-02 RX ORDER — OXYCODONE HYDROCHLORIDE 5 MG/1
5 TABLET ORAL EVERY 8 HOURS PRN
Qty: 21 TABLET | Refills: 0 | Status: SHIPPED | OUTPATIENT
Start: 2022-02-02 | End: 2022-02-09

## 2022-02-02 RX ORDER — SULFAMETHOXAZOLE AND TRIMETHOPRIM 800; 160 MG/1; MG/1
1 TABLET ORAL 2 TIMES DAILY
Qty: 10 TABLET | Refills: 0 | Status: SHIPPED | OUTPATIENT
Start: 2022-02-02 | End: 2022-02-07

## 2022-02-02 RX ORDER — ALBUMIN HUMAN 250 G/1000ML
SOLUTION INTRAVENOUS
Status: DISCONTINUED
Start: 2022-02-02 | End: 2022-02-02 | Stop reason: HOSPADM

## 2022-02-02 RX ORDER — OXYCODONE HYDROCHLORIDE 10 MG/1
10 TABLET ORAL EVERY 6 HOURS PRN
Qty: 28 TABLET | Refills: 0 | Status: SHIPPED | OUTPATIENT
Start: 2022-02-02 | End: 2022-02-02 | Stop reason: HOSPADM

## 2022-02-02 RX ORDER — IPRATROPIUM BROMIDE AND ALBUTEROL SULFATE 2.5; .5 MG/3ML; MG/3ML
3 SOLUTION RESPIRATORY (INHALATION) EVERY 6 HOURS
Status: DISCONTINUED | OUTPATIENT
Start: 2022-02-02 | End: 2022-02-02 | Stop reason: HOSPADM

## 2022-02-02 RX ADMIN — SERTRALINE HYDROCHLORIDE 50 MG: 50 TABLET ORAL at 08:02

## 2022-02-02 RX ADMIN — IPRATROPIUM BROMIDE AND ALBUTEROL SULFATE 3 ML: 2.5; .5 SOLUTION RESPIRATORY (INHALATION) at 12:02

## 2022-02-02 RX ADMIN — CARBIDOPA AND LEVODOPA 2 TABLET: 25; 100 TABLET ORAL at 08:02

## 2022-02-02 RX ADMIN — HEPARIN SODIUM 5000 UNITS: 5000 INJECTION, SOLUTION INTRAVENOUS; SUBCUTANEOUS at 05:02

## 2022-02-02 RX ADMIN — CARBIDOPA AND LEVODOPA 2 TABLET: 25; 100 TABLET ORAL at 02:02

## 2022-02-02 RX ADMIN — ROPINIROLE HYDROCHLORIDE 0.5 MG: 0.25 TABLET, FILM COATED ORAL at 08:02

## 2022-02-02 RX ADMIN — BENZTROPINE MESYLATE 1 MG: 0.5 TABLET ORAL at 08:02

## 2022-02-02 RX ADMIN — CARBIDOPA AND LEVODOPA 1 SPLIT TABLET: 25; 100 TABLET ORAL at 02:02

## 2022-02-02 NOTE — NURSING
Patient discharged with ordered medications from pharmacy.  Discharge paperwork given/explained to brother who will also transport patient home. IV discontinued. Care relinquished.

## 2022-02-02 NOTE — PLAN OF CARE
[facility] (Hospital)  Discharge Assessment    Primary Care Provider: Primary Doctor No     Discharge Assessment (most recent)     BRIEF DISCHARGE ASSESSMENT - 02/02/22 8898        Discharge Planning    Assessment Type Discharge Planning Brief Assessment     Resource/Environmental Concerns none     Support Systems Spouse/significant other     Equipment Currently Used at Home rollator     Current Living Arrangements home/apartment/condo     Patient/Family Anticipates Transition to home with family     Patient/Family Anticipated Services at Transition none     DME Needed Upon Discharge  none     Discharge Plan A Home with family     Discharge Plan B Home with family;Home Health

## 2022-02-02 NOTE — PLAN OF CARE
Problem: Adult Inpatient Plan of Care  Goal: Plan of Care Review  Outcome: Ongoing, Progressing  Goal: Patient-Specific Goal (Individualized)  Outcome: Ongoing, Progressing  Goal: Absence of Hospital-Acquired Illness or Injury  Outcome: Ongoing, Progressing  Goal: Optimal Comfort and Wellbeing  Outcome: Ongoing, Progressing  Goal: Readiness for Transition of Care  Outcome: Ongoing, Progressing   POC reviewed and updated with the patient. Questions regarding POC were encouraged and addressed with the patient.  VSS, see flow-sheets. Patient is AO X *4 at this time. Fall/safety precautions maintained, no signs of injury noted during shift. Patient was repositioned for comfort, bed locked in low position with side rails X *3, bed alarm set, with call light within reach. Patient instructed to call staff for mobility, verbalized understanding. No acute signs of distress noted at this time.

## 2022-02-02 NOTE — ANESTHESIA POSTPROCEDURE EVALUATION
Anesthesia Post Evaluation    Patient: Richard Briceno Sr.    Procedure(s) Performed: Procedure(s) (LRB):  INSERTION, FIDUCIAL SCREW, SKULL FOR DBD (N/A)  INSERTION,DEEP BRAIN STIMULATOR,LEAD ONLY (Bilateral)    Final Anesthesia Type: general      Patient location during evaluation: PACU  Patient participation: Yes- Able to Participate  Level of consciousness: awake and alert and oriented  Post-procedure vital signs: reviewed and stable  Pain management: adequate  Airway patency: patent    PONV status at discharge: No PONV  Anesthetic complications: no      Cardiovascular status: hemodynamically stable  Respiratory status: unassisted, spontaneous ventilation and room air  Hydration status: euvolemic  Follow-up not needed.          Vitals Value Taken Time   /65 02/01/22 1532   Temp 36.7 °C (98.1 °F) 02/01/22 1415   Pulse 71 02/01/22 1537   Resp 18 02/01/22 1537   SpO2 96 % 02/01/22 1537   Vitals shown include unvalidated device data.      Event Time   Out of Recovery 13:45:00         Pain/Bharath Score: Pain Rating Prior to Med Admin: 7 (2/1/2022  8:51 PM)  Bharath Score: 9 (2/1/2022  1:45 PM)

## 2022-02-02 NOTE — PLAN OF CARE
POC established and functional mobility goals were created to help pt return to PLOF. Will be reassessed as appropriate to measure pt progress.    Problem: Physical Therapy Goal  Goal: Physical Therapy Goal  Description: Goals to be met by: 22     Patient will increase functional independence with mobility by performin. Supine to sit with MInimal Assistance  2. Sit to supine with MInimal Assistance  3. Sit to stand transfer with Minimal Assistance  4. Bed to chair transfer with Minimal Assistance using LRAD  5. Gait  x 30 feet with Minimal Assistance using LRAD   6. Ascend/descend 1 stair with Minimal Assistance using LRAD.   7. Lower extremity exercise program x15 reps per handout, with independence    Outcome: Ongoing, Progressing

## 2022-02-02 NOTE — DISCHARGE INSTRUCTIONS
Neurosurgery Patient Information      -No driving until cleared in your post-operative appointment. No driving while on narcotics.   -Do not take any OTC products containing acetaminophen at the same time as you take your narcotic pain medication. Medications that may contain acetaminophen include but are not limited to: Excedrin and other headache medications, arthritis medications, cold and sinus medications, etc. Please review the list of active ingredients in any OTC medication prior to taking it.  -Continue to hold your Aspirin.  -Do not take any Aleve, Naprosyn, Naproxen, Ibuprofen, Advil or any other nonsteroidal anti-inflammatory drug (NSAID) for 2 weeks after surgery.  -Do not take any herbal supplements for 2 weeks after surgery.   -Do not consume any alcoholic beverages until released by your neurosurgeon  -Do not perform any excessive bending over or leaning forward as this is a fall hazard.  -Do not lift anything heavier than a gallon of milk until cleared in post-operative visit.     Contact the Neurosurgery Office immediately if:  If you begin to notice any neurologic changes such as:           -Sudden onset of lethargy or sleepiness           -Sudden confusion, trouble speaking, or understanding            -Sudden trouble seeing in one or both eyes            -Sudden trouble walking, dizziness, loss of coordination            -Sudden severe headache with no known cause            -Sudden onset of numbness or weakness       Wound Care:  Keep your incision open to air. You may shower on the 2nd day after your surgery. Please shower with baby shampoo, but do not take a bath. Keep the incision clean and dry at all times. Please cover the incision with saran wrap or other occlusive dressing while showering and REMOVE once you have completed taking your shower. Do not allow the force of water to hit the incision. If the incision gets damp, gently pat it dry. Do not rub or scrub the incision. You cannot  take a bath/swim/submerge the incision until 8 weeks after surgery.    The incision does not need to be cleaned with any water, soap, alcohol, peroxide, or other substance.    Please apply bacitracin ointment to incisions twice daily. You have staples in place. They will need to be removed.     You now have an implanted device in place. It is imperative that any infection (such as a urinary tract infection) be treated immediately so that it cannot get into your bloodstream. If an infection ends up in your blood, it may seed the device, thus requiring us to remove it. Call the Neurosurgery office or go to the Emergency Room for any signs of infection including: increased redness, drainage, pain or fever (temperature greater than or equal to 101.4).     Miscellaneous:  -Follow up in 1 week for placement of DBS battery placement.  -Take antibiotics as prescribed  -Use incentive spirometer as directed.    Neurosurgery Office: [phone]

## 2022-02-02 NOTE — PLAN OF CARE
Problem: Occupational Therapy Goal  Goal: Occupational Therapy Goal  Description: Goals to be met by: 2/16/2022    Patient will increase functional independence with ADLs by performing:    UE Dressing with Stand-by Assistance.  LE Dressing with Minimal Assistance.  Grooming while EOB with Stand-by Assistance.  Toileting from bedside commode with Minimal Assistance for hygiene and clothing management.   Supine to sit with Minimal Assistance.  Sit to stand transfer with Minimal Assistance with LRAD.  Toilet transfer to bedside commode with Minimal Assistance with LRAD.    Outcome: Ongoing, Progressing     Pt evaluated and OT goals established.

## 2022-02-02 NOTE — PT/OT/SLP EVAL
Occupational Therapy   Evaluation and Treatment    Name: Richard Briceno Sr.  MRN: [id number]  Admitting Diagnosis:  Parkinson's disease  Recent Surgery: Procedure(s) (LRB):  INSERTION, FIDUCIAL SCREW, SKULL FOR DBD (N/A)  INSERTION,DEEP BRAIN STIMULATOR,LEAD ONLY (Bilateral) 1 Day Post-Op     Co-eval with PT to have 2 skilled therapists present to safely assess pt's functional mobility.    Recommendations:     Discharge Recommendations: nursing facility, skilled  Discharge Equipment Recommendations:  bedside commode,other (see comments) (TBD)  Barriers to discharge:  Other (Comment) (increased level of assistance required)    Assessment:     Richard Briceno Sr. is a 71 y.o. male with a medical diagnosis of Parkinson's disease.  Pt had limited tolerance of session due to his somnolent state.  He requires significant assistance to perform ADLs and functional mobility.  Due to his current level of function, SNF recommended at d/c for maximal pt gains in functional independence and to ensure pt's safety upon returning home.   He presents with the following deficits. Performance deficits affecting function: weakness,impaired endurance,impaired self care skills,impaired functional mobilty,gait instability,impaired balance,decreased coordination,impaired fine motor,impaired coordination,decreased upper extremity function,decreased lower extremity function,decreased ROM.      Rehab Prognosis: Good; patient would benefit from acute skilled OT services to address these deficits and reach maximum level of function.       Plan:     Patient to be seen 4 x/week to address the above listed problems via self-care/home management,therapeutic exercises,therapeutic activities,neuromuscular re-education  · Plan of Care Expires: 03/01/22  · Plan of Care Reviewed with: patient,sibling    Subjective     Chief Complaint: none verbalized - pt was somnolent and non-verbal for most of the session  Patient/Family Comments/goals: Per pt's  brother, to bring the pt home.    Occupational Profile: (obtained from pt's brother due to pt being somnolent)  Living Environment: Pt lives with his wife and 2 adult children in a 2  with a threshold to enter.  He was living on the 2nd floor, but pt's brother said his family is arranging a room for him on the 1st floor.  Pt's brother was unsure whether or not there's a full bathroom on the 1st floor.  Previous level of function: Independent with functional mobility, but he had difficulty performing daily tasks due to UE/LE weakness.    Roles and Routines: , father; per chart, pt used to install A/C units on rooftops   Equipment Used at Home:  other (see comments) (pt's brother is unsure, but he said he doesn't remember his brother using DME to ambulate)  Assistance upon Discharge: His family, primarily his wife and son    Pain/Comfort:  · Pain Rating 1: 0/10  · Pain Rating Post-Intervention 1: 0/10    Patients cultural, spiritual, Taoism conflicts given the current situation: no    Objective:     Communicated with: nursing and PT prior to session.  Patient found HOB elevated with bed alarm with his brother, Uriel, present iupon OT entry to room.    General Precautions: Standard, fall   Orthopedic Precautions:N/A   Braces: N/A  Respiratory Status: Room air    Occupational Performance:    Bed Mobility:    · Patient completed Rolling/Turning to Left with  maximal assistance and 2 persons  · Patient completed Scooting/Bridging to EOB and to HOB with maximal assistance and 2 persons  · Patient completed Supine to Sit with maximal assistance and 2 persons    Functional Mobility/Transfers:  · Patient completed Sit <> Stand Transfer from EOB x 2 trials with maximal assistance of 2 persons with bilateral hand-held assist for 1st trial and with Mod A x 2 with B HHA for the 2nd trial  · Functional Mobility: Pt not safe to ambulate this date due to pt's somnolent state and significant posterior lean while standing.       Activities of Daily Living:  · Unable to formally assess due to pt's somnolent state, but based on writing therapist's clinical judgment he would require Max-Total Assistance for LBD, toileting, and bathing.     Cognitive/Visual Perceptual:  Cognitive/Psychosocial Skills:     -       Oriented to: Person   -       Follows Commands/attention:Limited command following due to lethargy  -       Communication: Low volume and intermittent garbled speech  -       Mood/Affect/Coping skills/emotional control: Cooperative and Lethargic    Physical Exam:  Upper Extremity Range of Motion:     -       Right Upper Extremity: WFL except AROM shoulder flexion less than 90 degrees; PROM WFL  -       Left Upper Extremity: WFL except AROM shoulder flexion less than 90 degrees; PROM WFL  Upper Extremity Strength:    -       Right Upper Extremity: difficult to assess due to pt's lethargic state  -       Left Upper Extremity:  difficult to assess due to pt's lethargic state   Strength:    -       Right Upper Extremity: moderate composite grasp  -       Left Upper Extremity: moderate composite grasp  Fine Motor Coordination:    -       Impaired  Left hand, finger to nose , Right hand, finger to nose , Left hand thumb/finger opposition skills, and Right hand thumb/finger opposition skills (possibly due to his lethargic state)    AMPAC 6 Click ADL:  AMPAC Total Score: 12    Treatment & Education:  Pt and his brother edu on role of OT, POC, benefit of performing EOB/OOB activity, and safety when performing functional transfers and mobility.    - Pt initially required Min A for static sitting balance EOB but progressed to SBA    Education:    Patient left HOB elevated with all lines intact, call button in reach and bed alarm on    GOALS:   Multidisciplinary Problems     Occupational Therapy Goals        Problem: Occupational Therapy Goal    Goal Priority Disciplines Outcome Interventions   Occupational Therapy Goal     OT, PT/OT  Ongoing, Progressing    Description: Goals to be met by: 2/16/2022    Patient will increase functional independence with ADLs by performing:    UE Dressing with Stand-by Assistance.  LE Dressing with Minimal Assistance.  Grooming while EOB with Stand-by Assistance.  Toileting from bedside commode with Minimal Assistance for hygiene and clothing management.   Supine to sit with Minimal Assistance.  Sit to stand transfer with Minimal Assistance with LRAD.  Toilet transfer to bedside commode with Minimal Assistance with LRAD.                     History:     Past Medical History:   Diagnosis Date    Anxiety     Coronary artery disease     Depression     Hypertension     Shortness of breath        Past Surgical History:   Procedure Laterality Date    CORONARY STENT PLACEMENT  07/2020    INSERTION, DEEP BRAIN STIMULATOR, LEAD ONLY Bilateral 2/1/2022    Procedure: INSERTION,DEEP BRAIN STIMULATOR,LEAD ONLY;  Surgeon: Bijan Light MD;  Location: [facility] OR [address] [place] [address];  Service: Neurosurgery;  Laterality: Bilateral;  INSERTION OF ELECTRODES FOR BILATERAL DEEP BRAIN STIMULATION/ RIGHT SIDE FIRST, LEFT SIDE SECOND. TEDS & SCD, MEDJEREMY WILHELM.    PLACEMENT OF FIDUCIAL SCREW INTO SPINE N/A 2/1/2022    Procedure: INSERTION, FIDUCIAL SCREW, SKULL FOR DBD;  Surgeon: Bijan Light MD;  Location: [facility] OR [address] [place] [address];  Service: Neurosurgery;  Laterality: N/A;  APPLY FIDUCIALS FOR DEEP BRAIN STIMULATION/ TEDS & SCD/JEREMY CARRINGTON.    TONSILLECTOMY         Time Tracking:     OT Date of Treatment: 02/02/22  OT Start Time: 1051  OT Stop Time: 1109  OT Total Time (min): 18 min    Billable Minutes:Evaluation 10 min  Therapeutic Activity 8 min    2/2/2022

## 2022-02-02 NOTE — OP NOTE
Date of Operation:  2/01/2022.    Preoperative Diagnosis:  Parkinson's disease, tremor dominant.    Postoperative Diagnosis:  Parkinson's disease, tremor dominant.    Procedure:  Placement of skull fiducial screws for intraoperative neuro navigation.  Bilateral implantation of deep brain stimulating electrodes in VIM thalamic nuclei with microelectrode recording (Tere Spain and Alicia).    Surgeon:  Bijan Light MD    Co Surgeon: Candelaria Mejía MD    Assistant:  Davis Desai MD (Resident in Neurosurgery)    Anesthesia:  Local/MAC (Brief general endotracheal)    Estimated Blood Loss:  Less than 50 ml.    Condition at End of Procedure:  Satisfactory.    Brief History:  This 71-year-old man first developed tremor about 7 years ago beginning in the left upper extremity and becoming bilateral and was diagnosed with Parkinson's disease.  He has been taking Sinemet, Requip, and Cogentin but tremor has become quite severe and poorly controlled.  He does somewhat better with gait and balance but does have some stiffness and rigidity.  He has [facility] and it was felt that he would benefit from deep brain stimulation.    Procedure in Detail:  The patient had navigation  MRI of the brain performed.  He was brought to the operating room and on his gurney given IV sedation.  A Levy catheter was inserted sequential compression devices applied to his legs and various intravenous line started.  The head of the gurney was elevated and the scalp shaved, prepped with Betadine and draped in a sterile fashion.  Five fiducial marker spots were made with a crayon, 2 fronto temporal, 2 parietal, and 1 just to the right of midline and each site was injected with 1% xylocaine and epinephrine.  At eachlocation a stab wound was made with a 15 blade and the pericranium scraped from the skull.  A 10 mm Medtronic skull fiducial screw was inserted with the power screwdriver and tightened by hand.  All  screws fit snugly.  The small incisions were then closed with interrupted 3-0 nylon sutures and reinjected with 0.5% Marcaine and epinephrine.  The scalp was washed, bacitracin ointment applied to the screws and the protective caps placed over them.  The head was wrapped with roller gauze.  At this point despite moderate sedation the patient continued to show violent tremor and it was not felt that useful CT of the head would be able to be obtained.  Under premedication he was intubated and induced with general anesthesia.  He was then brought on his gurney with Anesthesia in attendance to the CT scan room where CT scan of the head was obtained to merge with MRI for intraoperative neuro navigation.  He was returned to the operating room, allowed to awaken from anesthesia and extubated.  The navigation program was worked out on the computer.    The patient was transferred to the hospital bed and placed in the partial beach chair position.  The head was allowed to rest on a Porter horseshoe which had been padded.  The roller gauze was removed and the protective caps taken off of the screws.  The nonsterile reference frame was attached to the scalp and the screws registered to the system with good fidelity.  The navigation program was used to find the proposed entry sites in the bilateral posterior frontal areas and these were marked with a crown.  The nonsterile reference frame was removed, the scalp again prepped with Betadine, and the entry sites marked on the bone with an 18 gauge needle and bone awl.  Small horseshoe incisions were outlined around the entry sites and the scalp draped with the ban shower curtain drape.  Operation was begun on the right as his left-sided tremor is more significant.  The incision was injected with 1% xylocaine and epinephrine.  The skin incision was carried through the skin, galea and pericranium, bleeding controlled with bipolar coagulation, and the small skin flap elevated and  retracted anteriorly with a 2-0 silk suture and rubber band.  A 14 mm kristen hole was made at the entry site and widened with the M8 attachment.  The bur hole cover was attached to the skull with tightening the 2 screws and the next frame ring placed over the base and the 3 screws tightened to the skull also.  The navigation arc was attached to the ring base and the fiducial screws again registered to the system with good tolerance.  The socket assembly was placed on to the ring base and the probe used to find the trajectory and depth to target and these were marked on the micro drive.  The probe was changed to the multi lumen channel adapter.  A part was missing from the Alpha Omega navigation system and the Brandcast system was used.  The micro drive was attached to the ring base and the guide to brought down through the center hole, the dura coagulated and opened with an 11 blade and the guide tube advanced to 20 mm above target.  The reducing stylet was placed into the guide tube and the microelectrode collet attached to the microdrive.  A D-ZAP tungsten microelectrode was brought down through the guide tube to 10 mm above target and attached to the recording equipment.  With Dr. Spain and Dr. Vargas doing microelectrode recording and micro stimulation testing the electrode was gradually he brought down to 2 mm below target.  The patient had prominent tremor cell recordings particularly around 5 mm above target.  Stimulation gave good control of tremor although some tremor control had been achieved just by passing the electrode.  It was decided to place the tip of a Medtronic SenSight directional DBS electrode (3737) at 2.5 mm below target.  Stimulation with electrode gave very good tremor control without side effects and the electrode was capped into place.  The micro drive was removed and the locking cap placed over this.  The electrode was covered with an antibiotic soaked sponge.    Attention was  then turned to the left side where the same procedure was carried out--the small skin flap raised and retracted, a 14 mm kristen hole created, the kristen hole cover and next frame ring attached to the skull and the reference arc attached to the ring base.  Again the fiducials were registered with excellent tolerance.  The socket assembly was placed on to the ring base and the probe used to find the trajectory and depth to target and these were again marked on the micro drive.  The Leadpoint system was again used to bring the cannula down to 20 mm above target and the microelectrode to 10 mm above target.  With microelectrode recording again good tremor cells were seen going down almost to target.  The DBS electrode was placed at the same depth 2.5 mm below target, tested with good response and capped into place.  The micro drive assembly was removed.  The scalp was injected with more 1% Marcaine and epinephrine and the right-sided electrode brought over to the left.  Lead kits were attached to both electrodes.  The scalp was dissected and the 2 electrodes buried behind his left ear.  The wounds were thoroughly irrigated, the galea closed with inverted interrupted 4-0 Vicryl sutures and the skin closed with skin staples.  The fiducial screws removed and the small incisions closed with skin staples also.  Telfa bacitracin dressings were applied to the wound and the head wrapped with roller gauze.  The patient was transferred to his [place] and taken to the recovery area in satisfactory condition.  He received 2 g of Rocephin at the beginning of the procedure and Rocephin containing antibiotic irrigating solutions were used throughout.

## 2022-02-02 NOTE — DISCHARGE SUMMARY
[facility] - Neurosurgery ([facility])  Neurosurgery  Discharge Summary      Patient Name: Richard Briceno Sr.  MRN: [id number]  Admission Date: 2/1/2022  Hospital Length of Stay: 1 days  Discharge Date and Time: 2/2/2022  Attending Physician: Bijan Light MD   Discharging Provider: Marianne Thorpe PA-C  Primary Care Provider: Primary Doctor Ame    HPI:    Richard Briceno was seen in neurosurgical consultation at the office this morning.  He is a 71-year-old man who first began to note tremor of the left upper extremity at rest about 7 years ago.  This progressed to involve the right upper extremity.  He was seen in neurological evaluation by Dr. Yan in [place] in 2015 and diagnosed with Parkinson's disease.  This has been managed with Sinemet, Requip, and Cogentin but in the last year to the medications have been less effective and he has almost constant resting tremor.  He has noted some warbling jand hypophonia of his voice.  He has experienced stiffness in the upper extremities but no specific difficulties initiating movement and he feels his gait and balance are reasonably good.  He works installing air conditioners on roof tops.  He has had a coronary artery stent placed and remains on aspirin.  He does have some intermittent shortness of breath.  Review of systems is generally otherwise unremarkable.         Procedure(s) (LRB):  INSERTION, FIDUCIAL SCREW, SKULL FOR DBD (N/A)  INSERTION,DEEP BRAIN STIMULATOR,LEAD ONLY (Bilateral)     Hospital Course: POD 1 s/p DBS placement. Patient neuro stable per wife. Wife states after progression of Parkinson's patient sleeps often. Patient denies fevers, chills, chest pain, SOB, headaches, vision changes, or weakness. Tmax 100.4 overnight - IS encouraged and breathing treatment ordered. Educated patient and brother at bedside importance of IS use. Will discharge home with 5 days antibiotics. Medically stable for discharge to home with follow up in 1 week for  generator placement. Incision care and activity recommendations reviewed. Plan of care discussed with patient, wife, and brother and they voiced understanding. Their questions were all answered. Post op CTH stable.  PT/OT recs for SNF. Family comfortable taking him home with understanding for return to hospital for generator placement on 2/8.      Goals of Care Treatment Preferences:         Consults: PT/OT    Significant Diagnostic Studies: CTH, skull xray    Neurosurgery Physical Exam:  General: well developed, well nourished, no distress, sleepy.   Head: normocephalic, headwrap in place without drainage noted.  Neck: No tracheal deviation. No palpable masses. Full ROM.   Neurologic: Alert and oriented. Thought content appropriate.  GCS: E4 V5 M6. GCS Total: 15  Mental Status: Awake, Alert, Oriented x 4  Language: No aphasia  Speech: No dysarthria  Cranial nerves: face symmetric, tongue midline, CN II-XII grossly intact.   Eyes: pupils equal, round, reactive to light with accomodation, EOMI.   Pulmonary: normal respirations, no signs of respiratory distress  Abdomen: soft, non-distended, not tender to palpation    Sensory: intact to light touch throughout  Motor Strength:  Grossly Full strength upper and lower extremities. Rigidity noted to all extremities.       Vascular: No LE edema.   Skin: Skin is warm, dry and intact.     Cerebellar:   Finger-to-nose: intact bilaterally   Pronator drift: absent bilaterally        Pending Diagnostic Studies:     None        Final Active Diagnoses:    Diagnosis Date Noted POA    PRINCIPAL PROBLEM:  Parkinson's disease [G20] 07/22/2021 Yes      Problems Resolved During this Admission:      Discharged Condition: good     Disposition: Home or Self Care    Follow Up:   Follow-up Information     Bijan Light MD On 2/8/2022.    Specialty: Neurosurgery  Why: DBS battery placement  Contact information:  [address]  [facility] [address]  [phone]                        Patient Instructions:      Notify your health care provider if you experience any of the following:  increased confusion or weakness     Notify your health care provider if you experience any of the following:  persistent dizziness, light-headedness, or visual disturbances     Notify your health care provider if you experience any of the following:  worsening rash     Notify your health care provider if you experience any of the following:  severe persistent headache     Notify your health care provider if you experience any of the following:  difficulty breathing or increased cough     Notify your health care provider if you experience any of the following:  redness, tenderness, or signs of infection (pain, swelling, redness, odor or green/yellow discharge around incision site)     Notify your health care provider if you experience any of the following:  severe uncontrolled pain     Notify your health care provider if you experience any of the following:  persistent nausea and vomiting or diarrhea     Notify your health care provider if you experience any of the following:  temperature >100.4     Activity as tolerated     Medications:  Reconciled Home Medications:      Medication List      START taking these medications    oxyCODONE 5 MG immediate release tablet  Commonly known as: ROXICODONE  Take 1 tablet (5 mg total) by mouth every 8 (eight) hours as needed for Pain.     sulfamethoxazole-trimethoprim 800-160mg 800-160 mg Tab  Commonly known as: BACTRIM DS  Take 1 tablet by mouth 2 (two) times daily for 5 days        CONTINUE taking these medications    benztropine 1 MG tablet  Commonly known as: COGENTIN  Take 1 tablet by mouth 2 (two) times daily.     carbidopa-levodopa  mg  mg per tablet  Commonly known as: SINEMET  Take 2 and half tablets by mouth three times daily (8am, 12pm and 4pm)     rOPINIRole 1 MG tablet  Commonly known as: REQUIP  TAKE half a TABLET by mouth EVERY MORNING and 1 TABLET  EVERY EVENING     sertraline 50 MG tablet  Commonly known as: ZOLOFT  Take 50 mg by mouth.        STOP taking these medications    aspirin 81 MG Chew            Marianne Thorpe PA-C  Neurosurgery  [facility] - Neurosurgery ([facility])

## 2022-02-02 NOTE — OP NOTE
[facility] - Neurosurgery ([facility]  Neurosurgery  Operative Note    SUMMARY      Date of Procedure: 2/1/2022     Procedure: Procedure(s) (LRB):  INSERTION, FIDUCIAL SCREW, SKULL FOR DBD (N/A)  INSERTION, DEEP BRAIN STIMULATOR (Bilateral)     Surgeon(s) and Role:     * Bijan Light MD - Primary     * Davis Desai MD - Resident - Assisting     * Candelaria Mejía MD - Co-Surgeon    Pre-Operative Diagnosis: Parkinson's disease [G20]    Post-Operative Diagnosis: Post-Op Diagnosis Codes:     * Parkinson's disease [G20]    Anesthesia: Local MAC    Technical Procedures Used:   1. Placement of bilateral Vim electrodes for deep brain stimulation for Parkinson disease (Medtronic Sensight )   2. Use of neuronavigation   3. Microelectrode recording   4. Intraoperative testing and stimulation      Indications: Richard Briceno Sr. is a 71 y.o. male who bilateral Vim DBS placement. Because he rests his rifle on the right, he would like his GENERATOR ON LEFT side.     We had a lengthy discussion about the risks, benefits, and alternatives to deep brain stimulation.  Risks include, but are not limited to, bleeding, pain, infection, scarring, need for further/repeat procedure, speech difficulty, balance difficulty, cognitive changes, loss of inhibition, intracranial hemorrhage, venous infarct, seizure, stroke, paralysis, and death.  Hardware-related and programming-related complications may also occur.  This is an implanted device, meaning that any infection elsewhere in the body mass be treated immediately to minimize the risk of blood stream infection seeding the device.  Should this happen, it is possible that the entire system would require removal. He could lose the ability to swim. The device cannot stop the progression of Parkinson disease.      We also reviewed the work flow employed at [facility] for placement of deep brain stimulation devices.  We discussed that he would be admitted on a Tuesday morning for placement of 5  fiducial screws.  After the fiducial screws are placed, he would be transferred to CT scan to obtain a fiducial registration CT scan.  This would be merged with his already obtained MRI.  An operative plan would be created.  He would be brought back to the operating room for definitive placement of the DBS lead.  This is to be done with the patient awake.  He expressed understanding thereof.  The patient will then be brought back the following week for placement of extension cables and pulse generator under general anesthesia on outpatient basis. The device would subsequently be turned on approximately 2 weeks later by our movement disorders neurologist in the clinic.        Description of the Procedure:   The patient was placed in the supine position on the operating table and given mild IV sedation. His hair was clipped, and the scalp was prepped and draped in the usual sterile fashion. Five points were indicated for fiducial screws: one one each side just superior to the superior temporal line, one on each side over the parietal bone, and one just to the left of the vertex. Each of the sites was injected with 2cc of 1% lidocaine with epinephrine.      A stab incision was carried down through the galea, and the pericranium was scraped off the bone. Using the hand-held battery-powered screwdriver, the 10 mm bone fiducial was affixed to the skull. Once good purchase was confirmed with the manual , a 3.0 nylon stitch was used to secure the skin. The fiducial was dressed with bacitracin ointment, and a protective cap applied. This same process was repeated exactly for each of the other four screws. A sterile headwrap was then applied, and the patient was taken to the CT scanner for a navigation study. Because of his tremor, he was intubated and given Propofol sedation for the CT; he was then extubated by anesthesia at the conclusion of the procedure.      The CT was merged with a previously created plan based on  MRI findings and consensus coordinates for the bilateral Vim. The merge was found to be satisfactory. The patient was carefully repositioned on the operating room table.      A Levy catheter was inserted, sequential compression devices applied to legs, and the indicated intravenous lines started by anesthesia.  The head was somewhat elevated and allowed to rest on a Porter horseshoe, which had been padded.  The head wrap was removed and the protective caps taken off the fiducial screws. These were all in good position.  The nonsterile reference frame was then attached to the scalp and the fiducials registered to the system with excellent tolerance and appropriate accuracy (0.6mm).  The navigation program was used to find the proposed entry sites in the posterior frontal areas bilaterally and these were marked with a marking pen.  The nonsterile reference frame was removed.  The scalp was prepped with Betadine.  The bone at the two sites was marked with an 18-gauge needle and a bone awl and the Ioban shower curtain drape placed over the scalp.      A small horseshoe incision was outlined around the right entry site and injected with 1% Xylocaine and epinephrine. The incision was carried through the skin and galea and pericranium.  Bleeding was controlled with bipolar coagulation, the small flap elevated and retracted with a 2-0 silk suture and a rubber band.  A 14-mm kristen hole was made with a  and widened and undercut, particularly laterally, with the M8 attachment. Some gel foam soaked in thrombin was placed in the hole.  The kristen hole anchoring base was then placed over the kristen hole and these 2 screws affixed to the skull.  The NexFrame ring was placed over the base and these 3 screws tightened to the skull also.  The sterile reference arc was attached to the NexFrame ring and the skull fiducials re-registered to the system again with excellent accuracy (0.6 mm).  The socket assembly was placed onto  the NexFrame ring and the probe used to find the trajectory and depth to target (81.6 mm).  This was recorded on the microdrive.     The probe was exchanged for the multilumen channel adapter and the microdrive assembly placed on to this and locked into place.  The guide tube was brought down through the center of the Tommy Gun. The alexandra was coagulated and opened with a #15 blade, and the guide tube advanced to 20 mm above target.  The stylet was replaced with the reducing cannula. The microelectrode collet attached to the microdrive and a D-ZAP tungsten microelectrode brought down through the guide tube to 10 mm above target.  This was connected to the recording equipment and with Dr. Spain and Tere Light and Alicia performing microelectrode recording and physiological testing, the electrode was slowly advanced through the thalamus. Satisfactory recordings were obtained, and micro stimulation was carried out with good improvement in tremor.  There were no adverse side effects appreciated.      The tip of the DBS electrode was then placed 2.5 mm below target and again tested. The patient tolerated this well without concerning side effects; placement was found to be satisfactory.  The electrode was then capped into place and the microdrive assembly removed.  The NexFrame ring was unscrewed from the skull and the electrode capped into place with the locking cap.     We then repeated the exact same process on the left side, again obtaining good tremor control and no significant side effects with stimulation. We again placed the tip at 2.5mm below target.     The scalp was dissected in the subgaleal layer, and the right sided lead was passed to the left-sided incision. The DBS leads were connected to the lead kit.  The right-sided lead was marked with a silk ligature. The scalp was dissected and the leads buried behind the left ear. The wounds were then thoroughly irrigated.      The horseshoe incisions were closed with  inverted interrupted 4-0 Vicryl sutures in the galea and skin staples.  The fiducial screws were removed with a screwdriver and these small incisions closed with staples also.  Telfa and bacitracin dressings were applied to the scalp and the head re-wrapped with a roller gauze.  The patient remained alert through the procedure and was transferred back to his gurney and returned to PACU in satisfactory condition.  He received IV ceftriaxone at the beginning of the procedure, and rocephin-containing antibiotic irrigating solutions were used throughout.     Complications: No     Estimated Blood Loss (EBL): 25cc          Specimens:   Specimen (24h ago, onward)            None           Implants:   Implant Name Type Inv. Item Serial No.  Lot No. LRB No. Used Action   FIDUCIAL KIT UNI STEREO 10 MM - [id number]  FIDUCIAL KIT UNI STEREO 10 MM  [employer] [facility] [id number]  5 Implanted and Explanted   SenSight Directional Lead Kit    [id number] [employer]  Right 1 Implanted   SenSight Directional Lead Kit   [id number] [employer]  Left 1 Implanted   SCREWS       1 Implanted              Condition: Stable    Disposition: PACU - hemodynamically stable.    Attestation: I was present and scrubbed for the entire procedure.

## 2022-02-02 NOTE — NURSING
Report received per Bernardo. Patient in bed resting quietly. No apparent distress noted. Bed low, side rails up x2, and call light within reach. No needs noted. Care assumed.

## 2022-02-02 NOTE — PLAN OF CARE
[facility] - Neurosurgery (Hospital)  Discharge Final Note    Primary Care Provider: Primary Doctor No    Expected Discharge Date: 2/2/2022     Patient to be discharged home.  The patient does not have any home needs.  Family to provide transportation home.  Neurosurgery clinic to schedule follow up appointment.    Future Appointments   Date Time Provider Department Center   2/17/2022 12:00 PM Bijan Light MD [facility] NEUROS [facility]   2/24/2022  8:00 AM Elysia Vargas MD [facility] NEURO [facility]       Final Discharge Note (most recent)     Final Note - 02/02/22 1338        Final Note    Assessment Type Discharge Planning Brief Assessment                 Important Message from Medicare             Contact Info     Bijan Light MD   Specialty: Neurosurgery    [address]  [facility] [address]   Phone: [phone]       Next Steps: Follow up on 2/8/2022    Instructions: DBS battery placement

## 2022-02-02 NOTE — PT/OT/SLP EVAL
Physical Therapy Evaluation and Treatment with OT    Patient Name:  Richard Briceno Sr.   MRN:  [id number]    Pre-op Diagnosis: Parkinson's disease [G20    Procedure(s):  INSERTION, FIDUCIAL SCREW, SKULL FOR DBD  INSERTION,DEEP BRAIN STIMULATOR,LEAD ONLY     *Co-treatment with OT due to suspected patient complexity and need for two skilled therapists to ensure safe mobilization.    Recommendations:     Discharge Recommendations:  nursing facility, skilled   Discharge Equipment Recommendations: bedside commode (tbd)   Barriers to discharge: increased (A)    Highest Level of Mobility: STS  Assistance Required: mod(a)-max(a)    Assessment:     Richard Briceno Sr. is a 71 y.o. male admitted with a medical diagnosis of s/p DBS insertion. Pt met with HOB elevated, brother present and agreeable to PT treatment. All hx was provided by pt's brother as pt is very lethargic during evaluation. Pt's brother reports pt's PLOF is (I) with functional mobility and ADLs, but pt has had recent difficulty performing tasks due to B UE and LE tremors and weakness. At this time, pt requires significant assistance for bed mobility and transfers. He was unable to take steps. Pt is functioning below baseline and will benefit from SNF placement prior to d/c home for additional therapy to decrease fall risk and promote independence.    Problem list: weakness,impaired endurance,impaired coordination,impaired self care skills,decreased upper extremity function,impaired functional mobilty,decreased lower extremity function,decreased safety awareness,gait instability,impaired balance,impaired cardiopulmonary response to activity    Pt would benefit from continued skilled acute PT 4x/wk to address above listed functional deficits, provide patient/caregiver education, reduce fall risk, and maximize (I) and safety with functional mobility. After hospital discharge, pt would benefit from SNF to maximize rehab potential.      Rehab Prognosis: Good;  "patient would benefit from acute skilled PT services to address these deficits and reach maximum level of function.      Plan:     During this hospitalization, patient to be seen 4 x/week to address the identified rehab impairments via gait training,therapeutic exercises,neuromuscular re-education,therapeutic activities and progress toward the following goals:    · Plan of Care Expires:  03/02/22    This plan of care has been discussed with the patient/caregiver, who was included in its development and is in agreement with the identified goals and treatment plan.     Subjective     Communicated with RN prior to session.  Patient agreeable to participate.     Chief Complaint: Parkinson's disease s/p DBS insertion  Patient/Family Comments/goals: None stated    Pain/Comfort:  · Pain Rating 1: 0/10  · Pain Rating Post-Intervention 1: 0/10    Patients cultural, spiritual, Yazdanism conflicts given the current situation: no    Patient's living environment is as follows:  Living Environment: Pt lives with spouse in 2SH with 1 KEKE. Pt's primary bedroom is on the 2nd floor, but he has access to a bedroom and bathroom on the 1st floor. Bathroom set-up: pt's brother states he is unsure of bathroom setup.  Prior Level of Function: independent with mobility and ADLs per pt's brother. Pt has had recent difficulty performing tasks due to B UE and LE tremors and weakness.   DME used:  (pt's brother states he is unsure if pt has DME. He states he was not using an AD to walk with)  DME owned (not currently used): Pt's brother is unsure if pt has DME.  Upon discharge, patient will have assistance from: Pt's spouse and 2 adult children    Objective:     Patient found HOB elevated with bed alarm  upon PT entry to room.    General Precautions: Standard, fall   Orthopedic Precautions:N/A   Braces: N/A   /63 (BP Location: Left arm, Patient Position: Lying)   Pulse 79   Temp 99.7 °F (37.6 °C) (Oral)   Resp 18   Ht 5' 8" (1.727 m) "   Wt 91.2 kg (201 lb 1 oz)   SpO2 (!) 90%   BMI 30.57 kg/m²   Oxygen Device: room air       Exams:     Cognition:  o Pt is lethargic and oriented x1  o Command following: Limited command following secondary to lethargy     Sensation:   o Light touch sensation: Intact BLEs     Edema: None present     Tone: None present     Postural examination/scapula alignment: Rounded shoulder and Head forward     Lower Extremity Range of Motion:  o Right Lower Extremity: WNL  o Left Lower Extremity: WNL     Lower Extremity Strength:  · Unable to assess formally due to impaired command following secondary to lethargy     Functional Mobility:    Bed Mobility:  · Supine to Sit: Maximum Assistance and 2 persons on L side of bed  · Sit to Supine: Maximum Assistance and 2 persons  · Rolling L: Maximum Assistance and 2 persons  · Scooting/Bridging in supine to HOB: Maximum Assistance and 2 persons via drawsheet    Transfers:   · Sit to Stand Transfer:   · 2 trials:  ·  Maximum Assistance  from EOB with HHAx2  · Pt with posterior trunk lean and B hip FL  · Moderate Assistance from EOB with HHAx2  · Bed to Chair: Activity did not occur , not appropriate due to pt's assist level required and pt lethargy             Gait:  · Patient unable at this time    Balance:  · Static Sit:   · Initially mod(A), then progressed to SBA at EOB  · Good: Patient able to maintain balance without handhold support, limited postural sway.  · Static Stand:    · Max Assist with Hand-held assist x 2  · Poor: Patient requires handhold support and moderate to maximal assistance to maintain position        Therapeutic Activities/Exercises      Patient assisted with functional mobility as noted above   STSx2 from EOB   Patient educated on the importance of early mobility to prevent functional decline during hospital stay   Patient was instructed to utilize staff assistance for mobility/transfers.   Patient educated on PT POC and role of PT in acute  care   White board updated to include patient's safest level of mobility with staff assistance, RN also updated    AM-PAC 6 CLICK MOBILITY  Turning over in bed (including adjusting bedclothes, sheets and blankets)?: 2  Sitting down on and standing up from a chair with arms (e.g., wheelchair, bedside commode, etc.): 2  Moving from lying on back to sitting on the side of the bed?: 2  Moving to and from a bed to a chair (including a wheelchair)?: 2  Need to walk in hospital room?: 2  Climbing 3-5 steps with a railing?: 1  Basic Mobility Total Score: 11      Patient left HOB elevated with call button in reach, bed alarm on and RN notified.      History/Goals:     PAST MEDICAL HISTORY:  Past Medical History:   Diagnosis Date    Anxiety     Coronary artery disease     Depression     Hypertension     Shortness of breath        Past Surgical History:   Procedure Laterality Date    CORONARY STENT PLACEMENT  07/2020    INSERTION, DEEP BRAIN STIMULATOR, LEAD ONLY Bilateral 2/1/2022    Procedure: INSERTION,DEEP BRAIN STIMULATOR,LEAD ONLY;  Surgeon: Bijan Light MD;  Location: [facility] OR [address] [place] [address];  Service: Neurosurgery;  Laterality: Bilateral;  INSERTION OF ELECTRODES FOR BILATERAL DEEP BRAIN STIMULATION/ RIGHT SIDE FIRST, LEFT SIDE SECOND. TEDS & SCD, JEREMY CARRINGTON.    PLACEMENT OF FIDUCIAL SCREW INTO SPINE N/A 2/1/2022    Procedure: INSERTION, FIDUCIAL SCREW, SKULL FOR DBD;  Surgeon: Bijan Light MD;  Location: [facility] OR [address] [place] [address];  Service: Neurosurgery;  Laterality: N/A;  APPLY FIDUCIALS FOR DEEP BRAIN STIMULATION/ TEDS & SCD/JEREMY CARRINGTON.    TONSILLECTOMY         GOALS:   Multidisciplinary Problems     Physical Therapy Goals        Problem: Physical Therapy Goal    Goal Priority Disciplines Outcome Goal Variances Interventions   Physical Therapy Goal     PT, PT/OT Ongoing, Progressing     Description: Goals to be met by: 2/16/22     Patient will increase functional independence with mobility by  performin. Supine to sit with MInimal Assistance  2. Sit to supine with MInimal Assistance  3. Sit to stand transfer with Minimal Assistance  4. Bed to chair transfer with Minimal Assistance using LRAD  5. Gait  x 30 feet with Minimal Assistance using LRAD   6. Ascend/descend 1 stair with Minimal Assistance using LRAD.   7. Lower extremity exercise program x15 reps per handout, with independence                     Time Tracking:     PT Received On: 22  PT Start Time: 1051     PT Stop Time: 1109  PT Total Time (min): 18 min     Billable Minutes: Evaluation 10 and Therapeutic Exercise 8      Elizabeth Herman, PT  2022  Pager# [phone]

## 2022-02-04 PROCEDURE — 95962 PR FUNC CORTICAL MAP,BRAIN,EA ADDN HR: ICD-10-PCS | Mod: 26,,, | Performed by: PSYCHIATRY & NEUROLOGY

## 2022-02-04 PROCEDURE — 95961 ELECTRODE STIMULATION BRAIN: CPT | Mod: 26,,, | Performed by: PSYCHIATRY & NEUROLOGY

## 2022-02-04 PROCEDURE — 95962 ELECTRODE STIM BRAIN ADD-ON: CPT | Mod: 26,,, | Performed by: PSYCHIATRY & NEUROLOGY

## 2022-02-04 PROCEDURE — 95961 PR ELECTRODE STIM,BRAIN,MD 1ST HR: ICD-10-PCS | Mod: 26,,, | Performed by: PSYCHIATRY & NEUROLOGY

## 2022-02-04 NOTE — PROCEDURES
Procedures   2/1/22  DBS IntraOP Brain Mapping    HPI: 70yo man with Parkinson's com ing for bilateral VIM DBS for tremor control.      Target  VIM  Laterality R  Lead Model [id number]  Lead Passes  1    Patient was stimulated over 120 minutes.   Leadpoint    Pass1  Tremor control and no sfx up to 4V    Final Position  Tip at 2.5 mm below target      _________________________________________    Target  VIM  Laterality L  Lead Model [id number]  Lead Passes  1    Patient was stimulated over 60 minutes.  Leadpoint    Pass1  Tremor control and no sfx up to 4V    Final Position  Tip at 2.5 mm below target    Elysia Vargas MD, MS  [facility]  Department of Neurology  Movement Disorders

## 2022-02-07 NOTE — PRE-PROCEDURE INSTRUCTIONS
PREOP INSTRUCTIONS TO PATIENT'S WIFE:Nothing to eat or drink for 8 hours before surgery.Instructed to follow the surgeon's instructions if they differ from these.Shower instructions as well as directions to the Surgery Center were given.Encouraged to wear loose fitting,comfortable clothing.Medication instructions for pm prior to and am of procedure reviewed.Instructed to avoid taking vitamins,supplements,aspirin and ibuprofen the morning of surgery.    Patient's wife denies patient having any side effects or issues with anesthesia or sedation.

## 2022-02-08 ENCOUNTER — HOSPITAL ENCOUNTER (OUTPATIENT)
Facility: HOSPITAL | Age: 72
Discharge: HOME | End: 2022-02-08
Attending: NEUROLOGICAL SURGERY | Admitting: NEUROLOGICAL SURGERY
Payer: MEDICARE

## 2022-02-08 VITALS
OXYGEN SATURATION: 95 % | SYSTOLIC BLOOD PRESSURE: 171 MMHG | TEMPERATURE: 99 F | BODY MASS INDEX: 30.46 KG/M2 | HEART RATE: 82 BPM | WEIGHT: 201 LBS | HEIGHT: 68 IN | DIASTOLIC BLOOD PRESSURE: 88 MMHG | RESPIRATION RATE: 20 BRPM

## 2022-02-08 LAB
CTP QC/QA: YES
SARS-COV-2 AG RESP QL IA.RAPID: POSITIVE

## 2022-02-08 PROCEDURE — 99499 NO LOS: ICD-10-PCS | Mod: ,,, | Performed by: NEUROLOGICAL SURGERY

## 2022-02-08 PROCEDURE — 99499 UNLISTED E&M SERVICE: CPT | Mod: ,,, | Performed by: NEUROLOGICAL SURGERY

## 2022-02-08 NOTE — PROGRESS NOTES
Surgery has been cancelled. IV discontinued. Instructions given to reschedule surgery. covid precautions explained to patient. N95 masks provided. D/C via wheelchair with Brother Uriel.

## 2022-02-08 NOTE — PLAN OF CARE
Chart reviewed. Patient tested positive for Covid on arrival to [facility]..Covid isolation precautions initiated, MD notified. Pre-op nursing care and surgery checklist complete. Awaiting anesthesia consent, H&P update and orders. Brother at bedside. Patient belongings given to family.

## 2022-02-16 ENCOUNTER — TELEPHONE (OUTPATIENT)
Dept: NEUROSURGERY | Facility: CLINIC | Age: 72
End: 2022-02-16
Payer: MEDICARE

## 2022-02-16 DIAGNOSIS — G20.A1 PARKINSON'S DISEASE: Primary | ICD-10-CM

## 2022-02-23 ENCOUNTER — ANESTHESIA (OUTPATIENT)
Dept: SURGERY | Facility: HOSPITAL | Age: 72
End: 2022-02-23
Payer: MEDICARE

## 2022-02-23 ENCOUNTER — ANESTHESIA EVENT (OUTPATIENT)
Dept: SURGERY | Facility: HOSPITAL | Age: 72
End: 2022-02-23
Payer: MEDICARE

## 2022-02-23 ENCOUNTER — HOSPITAL ENCOUNTER (OUTPATIENT)
Facility: HOSPITAL | Age: 72
Discharge: HOME | End: 2022-02-24
Attending: NEUROLOGICAL SURGERY | Admitting: NEUROLOGICAL SURGERY
Payer: MEDICARE

## 2022-02-23 DIAGNOSIS — G20.A1 PARKINSON'S DISEASE: Primary | ICD-10-CM

## 2022-02-23 DIAGNOSIS — G20.A1 PARKINSONS: ICD-10-CM

## 2022-02-23 PROCEDURE — 61886 PR IMP STIM,CRANIAL,SUBQ,>1 ARRAY: ICD-10-PCS | Mod: 58,GC,, | Performed by: NEUROLOGICAL SURGERY

## 2022-02-23 PROCEDURE — 61886 IMPLANT NEUROSTIM ARRAYS: CPT | Mod: 58,GC,, | Performed by: NEUROLOGICAL SURGERY

## 2022-02-23 PROCEDURE — 25000003 PHARM REV CODE 250: Performed by: NEUROLOGICAL SURGERY

## 2022-02-23 PROCEDURE — 36000711: Performed by: NEUROLOGICAL SURGERY

## 2022-02-23 PROCEDURE — 71000033 HC RECOVERY, INTIAL HOUR: Performed by: NEUROLOGICAL SURGERY

## 2022-02-23 PROCEDURE — 36000710: Performed by: NEUROLOGICAL SURGERY

## 2022-02-23 PROCEDURE — C1767 GENERATOR, NEURO NON-RECHARG: HCPCS | Performed by: NEUROLOGICAL SURGERY

## 2022-02-23 PROCEDURE — D9220A PRA ANESTHESIA: Mod: ANES,,, | Performed by: ANESTHESIOLOGY

## 2022-02-23 PROCEDURE — 37000009 HC ANESTHESIA EA ADD 15 MINS: Performed by: NEUROLOGICAL SURGERY

## 2022-02-23 PROCEDURE — 25000003 PHARM REV CODE 250: Performed by: STUDENT IN AN ORGANIZED HEALTH CARE EDUCATION/TRAINING PROGRAM

## 2022-02-23 PROCEDURE — 71000016 HC POSTOP RECOV ADDL HR: Performed by: NEUROLOGICAL SURGERY

## 2022-02-23 PROCEDURE — D9220A PRA ANESTHESIA: Mod: CRNA,,, | Performed by: NURSE ANESTHETIST, CERTIFIED REGISTERED

## 2022-02-23 PROCEDURE — D9220A PRA ANESTHESIA: ICD-10-PCS | Mod: CRNA,,, | Performed by: NURSE ANESTHETIST, CERTIFIED REGISTERED

## 2022-02-23 PROCEDURE — 71000015 HC POSTOP RECOV 1ST HR: Performed by: NEUROLOGICAL SURGERY

## 2022-02-23 PROCEDURE — 27201423 OPTIME MED/SURG SUP & DEVICES STERILE SUPPLY: Performed by: NEUROLOGICAL SURGERY

## 2022-02-23 PROCEDURE — 27000221 HC OXYGEN, UP TO 24 HOURS

## 2022-02-23 PROCEDURE — D9220A PRA ANESTHESIA: ICD-10-PCS | Mod: ANES,,, | Performed by: ANESTHESIOLOGY

## 2022-02-23 PROCEDURE — C1883 ADAPT/EXT, PACING/NEURO LEAD: HCPCS | Performed by: NEUROLOGICAL SURGERY

## 2022-02-23 PROCEDURE — 63600175 PHARM REV CODE 636 W HCPCS: Performed by: STUDENT IN AN ORGANIZED HEALTH CARE EDUCATION/TRAINING PROGRAM

## 2022-02-23 PROCEDURE — 63600175 PHARM REV CODE 636 W HCPCS: Performed by: NEUROLOGICAL SURGERY

## 2022-02-23 PROCEDURE — 37000008 HC ANESTHESIA 1ST 15 MINUTES: Performed by: NEUROLOGICAL SURGERY

## 2022-02-23 PROCEDURE — 99900035 HC TECH TIME PER 15 MIN (STAT)

## 2022-02-23 DEVICE — IMPLANTABLE DEVICE: Type: IMPLANTABLE DEVICE | Site: CHEST | Status: FUNCTIONAL

## 2022-02-23 DEVICE — NEUROSTIMULATR PERCEPT 58X51MM: Type: IMPLANTABLE DEVICE | Site: CHEST | Status: FUNCTIONAL

## 2022-02-23 RX ORDER — PHENYLEPHRINE HYDROCHLORIDE 10 MG/ML
INJECTION INTRAVENOUS
Status: DISCONTINUED | OUTPATIENT
Start: 2022-02-23 | End: 2022-02-23

## 2022-02-23 RX ORDER — ACETAMINOPHEN 325 MG/1
650 TABLET ORAL EVERY 4 HOURS PRN
Status: DISCONTINUED | OUTPATIENT
Start: 2022-02-23 | End: 2022-02-24 | Stop reason: HOSPADM

## 2022-02-23 RX ORDER — CEFTRIAXONE 1 G/1
INJECTION, POWDER, FOR SOLUTION INTRAMUSCULAR; INTRAVENOUS
Status: DISCONTINUED | OUTPATIENT
Start: 2022-02-23 | End: 2022-02-23 | Stop reason: HOSPADM

## 2022-02-23 RX ORDER — IBUPROFEN 200 MG
16 TABLET ORAL
Status: DISCONTINUED | OUTPATIENT
Start: 2022-02-23 | End: 2022-02-24 | Stop reason: HOSPADM

## 2022-02-23 RX ORDER — HYDROCODONE BITARTRATE AND ACETAMINOPHEN 5; 325 MG/1; MG/1
1 TABLET ORAL EVERY 4 HOURS PRN
Status: DISCONTINUED | OUTPATIENT
Start: 2022-02-23 | End: 2022-02-24 | Stop reason: HOSPADM

## 2022-02-23 RX ORDER — SERTRALINE HYDROCHLORIDE 50 MG/1
50 TABLET, FILM COATED ORAL NIGHTLY
Status: DISCONTINUED | OUTPATIENT
Start: 2022-02-23 | End: 2022-02-24 | Stop reason: HOSPADM

## 2022-02-23 RX ORDER — ROPINIROLE 0.5 MG/1
0.5 TABLET, FILM COATED ORAL NIGHTLY
Status: DISCONTINUED | OUTPATIENT
Start: 2022-02-23 | End: 2022-02-23

## 2022-02-23 RX ORDER — AMOXICILLIN 250 MG
2 CAPSULE ORAL NIGHTLY PRN
Status: DISCONTINUED | OUTPATIENT
Start: 2022-02-23 | End: 2022-02-24 | Stop reason: HOSPADM

## 2022-02-23 RX ORDER — PROPOFOL 10 MG/ML
VIAL (ML) INTRAVENOUS
Status: DISCONTINUED | OUTPATIENT
Start: 2022-02-23 | End: 2022-02-23

## 2022-02-23 RX ORDER — FENTANYL CITRATE 50 UG/ML
25 INJECTION, SOLUTION INTRAMUSCULAR; INTRAVENOUS EVERY 5 MIN PRN
Status: DISCONTINUED | OUTPATIENT
Start: 2022-02-23 | End: 2022-02-23 | Stop reason: HOSPADM

## 2022-02-23 RX ORDER — ONDANSETRON 8 MG/1
8 TABLET, ORALLY DISINTEGRATING ORAL EVERY 6 HOURS PRN
Status: DISCONTINUED | OUTPATIENT
Start: 2022-02-23 | End: 2022-02-24 | Stop reason: HOSPADM

## 2022-02-23 RX ORDER — MIDAZOLAM HYDROCHLORIDE 1 MG/ML
INJECTION INTRAMUSCULAR; INTRAVENOUS
Status: DISCONTINUED | OUTPATIENT
Start: 2022-02-23 | End: 2022-02-23

## 2022-02-23 RX ORDER — SODIUM CHLORIDE 9 MG/ML
INJECTION, SOLUTION INTRAVENOUS CONTINUOUS PRN
Status: DISCONTINUED | OUTPATIENT
Start: 2022-02-23 | End: 2022-02-23

## 2022-02-23 RX ORDER — DEXAMETHASONE SODIUM PHOSPHATE 4 MG/ML
INJECTION, SOLUTION INTRA-ARTICULAR; INTRALESIONAL; INTRAMUSCULAR; INTRAVENOUS; SOFT TISSUE
Status: DISCONTINUED | OUTPATIENT
Start: 2022-02-23 | End: 2022-02-23

## 2022-02-23 RX ORDER — ROPINIROLE 1 MG/1
1 TABLET, FILM COATED ORAL NIGHTLY
Status: DISCONTINUED | OUTPATIENT
Start: 2022-02-23 | End: 2022-02-24 | Stop reason: HOSPADM

## 2022-02-23 RX ORDER — IBUPROFEN 200 MG
24 TABLET ORAL
Status: DISCONTINUED | OUTPATIENT
Start: 2022-02-23 | End: 2022-02-24 | Stop reason: HOSPADM

## 2022-02-23 RX ORDER — BENZTROPINE MESYLATE 0.5 MG/1
1 TABLET ORAL 2 TIMES DAILY
Status: DISCONTINUED | OUTPATIENT
Start: 2022-02-23 | End: 2022-02-24 | Stop reason: HOSPADM

## 2022-02-23 RX ORDER — ONDANSETRON 2 MG/ML
4 INJECTION INTRAMUSCULAR; INTRAVENOUS DAILY PRN
Status: DISCONTINUED | OUTPATIENT
Start: 2022-02-23 | End: 2022-02-23 | Stop reason: HOSPADM

## 2022-02-23 RX ORDER — CARBIDOPA AND LEVODOPA 25; 100 MG/1; MG/1
2 TABLET ORAL 3 TIMES DAILY
Status: DISCONTINUED | OUTPATIENT
Start: 2022-02-23 | End: 2022-02-23

## 2022-02-23 RX ORDER — ROCURONIUM BROMIDE 10 MG/ML
INJECTION, SOLUTION INTRAVENOUS
Status: DISCONTINUED | OUTPATIENT
Start: 2022-02-23 | End: 2022-02-23

## 2022-02-23 RX ORDER — CEFAZOLIN SODIUM/WATER 2 G/20 ML
2 SYRINGE (ML) INTRAVENOUS ONCE
Status: DISCONTINUED | OUTPATIENT
Start: 2022-02-23 | End: 2022-02-23 | Stop reason: HOSPADM

## 2022-02-23 RX ORDER — LIDOCAINE HCL/PF 100 MG/5ML
SYRINGE (ML) INTRAVENOUS
Status: DISCONTINUED | OUTPATIENT
Start: 2022-02-23 | End: 2022-02-23

## 2022-02-23 RX ORDER — ROPINIROLE 0.25 MG/1
0.5 TABLET, FILM COATED ORAL DAILY
Status: DISCONTINUED | OUTPATIENT
Start: 2022-02-24 | End: 2022-02-24 | Stop reason: HOSPADM

## 2022-02-23 RX ORDER — LIDOCAINE HYDROCHLORIDE AND EPINEPHRINE 10; 10 MG/ML; UG/ML
INJECTION, SOLUTION INFILTRATION; PERINEURAL
Status: DISCONTINUED | OUTPATIENT
Start: 2022-02-23 | End: 2022-02-23 | Stop reason: HOSPADM

## 2022-02-23 RX ORDER — GLUCAGON 1 MG
1 KIT INJECTION
Status: DISCONTINUED | OUTPATIENT
Start: 2022-02-23 | End: 2022-02-24 | Stop reason: HOSPADM

## 2022-02-23 RX ORDER — CARBIDOPA AND LEVODOPA 25; 100 MG/1; MG/1
2 TABLET ORAL 3 TIMES DAILY
Status: DISCONTINUED | OUTPATIENT
Start: 2022-02-23 | End: 2022-02-24 | Stop reason: HOSPADM

## 2022-02-23 RX ORDER — ONDANSETRON 2 MG/ML
INJECTION INTRAMUSCULAR; INTRAVENOUS
Status: DISCONTINUED | OUTPATIENT
Start: 2022-02-23 | End: 2022-02-23

## 2022-02-23 RX ORDER — LABETALOL HCL 20 MG/4 ML
10 SYRINGE (ML) INTRAVENOUS ONCE
Status: DISCONTINUED | OUTPATIENT
Start: 2022-02-23 | End: 2022-02-24 | Stop reason: HOSPADM

## 2022-02-23 RX ORDER — FENTANYL CITRATE 50 UG/ML
INJECTION, SOLUTION INTRAMUSCULAR; INTRAVENOUS
Status: DISCONTINUED | OUTPATIENT
Start: 2022-02-23 | End: 2022-02-23

## 2022-02-23 RX ADMIN — MIDAZOLAM HYDROCHLORIDE 2 MG: 1 INJECTION, SOLUTION INTRAMUSCULAR; INTRAVENOUS at 01:02

## 2022-02-23 RX ADMIN — LIDOCAINE HYDROCHLORIDE 100 MG: 20 INJECTION, SOLUTION INTRAVENOUS at 01:02

## 2022-02-23 RX ADMIN — ROPINIROLE HYDROCHLORIDE 1 MG: 1 TABLET, FILM COATED ORAL at 08:02

## 2022-02-23 RX ADMIN — PHENYLEPHRINE HYDROCHLORIDE 100 MCG: 10 INJECTION INTRAVENOUS at 04:02

## 2022-02-23 RX ADMIN — ROCURONIUM BROMIDE 30 MG: 10 INJECTION, SOLUTION INTRAVENOUS at 02:02

## 2022-02-23 RX ADMIN — BENZTROPINE MESYLATE 1 MG: 0.5 TABLET ORAL at 08:02

## 2022-02-23 RX ADMIN — SODIUM CHLORIDE: 0.9 INJECTION, SOLUTION INTRAVENOUS at 01:02

## 2022-02-23 RX ADMIN — PROPOFOL 180 MG: 10 INJECTION, EMULSION INTRAVENOUS at 01:02

## 2022-02-23 RX ADMIN — SODIUM CHLORIDE, SODIUM GLUCONATE, SODIUM ACETATE, POTASSIUM CHLORIDE, MAGNESIUM CHLORIDE, SODIUM PHOSPHATE, DIBASIC, AND POTASSIUM PHOSPHATE: .53; .5; .37; .037; .03; .012; .00082 INJECTION, SOLUTION INTRAVENOUS at 02:02

## 2022-02-23 RX ADMIN — ROCURONIUM BROMIDE 30 MG: 10 INJECTION, SOLUTION INTRAVENOUS at 01:02

## 2022-02-23 RX ADMIN — DEXAMETHASONE SODIUM PHOSPHATE 4 MG: 4 INJECTION, SOLUTION INTRAMUSCULAR; INTRAVENOUS at 02:02

## 2022-02-23 RX ADMIN — SUGAMMADEX 400 MG: 100 INJECTION, SOLUTION INTRAVENOUS at 03:02

## 2022-02-23 RX ADMIN — PROPOFOL 50 MG: 10 INJECTION, EMULSION INTRAVENOUS at 02:02

## 2022-02-23 RX ADMIN — CARBIDOPA AND LEVODOPA 1 SPLIT TABLET: 25; 100 TABLET ORAL at 08:02

## 2022-02-23 RX ADMIN — SERTRALINE HYDROCHLORIDE 50 MG: 50 TABLET ORAL at 08:02

## 2022-02-23 RX ADMIN — CEFTRIAXONE SODIUM 2 G: 1 INJECTION, SOLUTION INTRAVENOUS at 02:02

## 2022-02-23 RX ADMIN — CARBIDOPA AND LEVODOPA 2 TABLET: 25; 100 TABLET ORAL at 09:02

## 2022-02-23 RX ADMIN — PHENYLEPHRINE HYDROCHLORIDE 100 MCG: 10 INJECTION INTRAVENOUS at 02:02

## 2022-02-23 RX ADMIN — ONDANSETRON HYDROCHLORIDE 4 MG: 2 INJECTION INTRAMUSCULAR; INTRAVENOUS at 03:02

## 2022-02-23 RX ADMIN — FENTANYL CITRATE 100 MCG: 50 INJECTION, SOLUTION INTRAMUSCULAR; INTRAVENOUS at 01:02

## 2022-02-23 NOTE — ANESTHESIA PREPROCEDURE EVALUATION
[facility]  Anesthesia Pre-Operative Evaluation         Patient Name: Richard Briceno Sr.  YOB: 1950  MRN: [id number]    SUBJECTIVE:     Pre-operative evaluation for Procedure(s) (LRB):  INSERTION, PULSE GENERATOR, DEEP BRAIN STIMULATOR (Left)     02/23/2022    Richard Briceno Sr. is a 71 y.o. male w/ a significant PMHx of parkinson's.    71 year old man with a 7 year history of progressive Parkinson's disease, tremor dominant underwent bilateral implantation of DBS electrodes in the VIM thalamic nuclei last week.  He now returns for insertion of the pulse generator.     Patient now presents for the above procedure(s).      LDA: None documented.        Prev airway: grade 1 with mccgrath    Drips: None documented.       Patient Active Problem List   Diagnosis    Parkinson's disease    Mild cognitive impairment    Anxiety    Depression       Review of patient's allergies indicates:  No Known Allergies    Current Outpatient Medications:  No current facility-administered medications for this encounter.    Current Outpatient Medications:     benztropine (COGENTIN) 1 MG tablet, Take 1 tablet by mouth 2 (two) times daily., Disp: , Rfl:     carbidopa-levodopa  mg (SINEMET)  mg per tablet, Take 2 and half tablets by mouth three times daily (8am, 12pm and 4pm), Disp: , Rfl:     rOPINIRole (REQUIP) 1 MG tablet, TAKE half a TABLET by mouth EVERY MORNING and 1 TABLET EVERY EVENING, Disp: , Rfl:     sertraline (ZOLOFT) 50 MG tablet, Take 50 mg by mouth every evening., Disp: , Rfl:     Past Surgical History:   Procedure Laterality Date    CORONARY STENT PLACEMENT  07/2020    INSERTION, DEEP BRAIN STIMULATOR, LEAD ONLY Bilateral 2/1/2022    Procedure: INSERTION,DEEP BRAIN STIMULATOR,LEAD ONLY;  Surgeon: Bijan Light MD;  Location: [facility] OR [address] [place] [address];  Service: Neurosurgery;  Laterality: Bilateral;  INSERTION OF ELECTRODES FOR BILATERAL DEEP BRAIN STIMULATION/ RIGHT SIDE FIRST,  LEFT SIDE SECOND. TEDS & SCD, MEDJEREMY WILHELM.    PLACEMENT OF FIDUCIAL SCREW INTO SPINE N/A 2/1/2022    Procedure: INSERTION, FIDUCIAL SCREW, SKULL FOR DBD;  Surgeon: Bijan Light MD;  Location: [facility] OR [address] [place] [address];  Service: Neurosurgery;  Laterality: N/A;  APPLY FIDUCIALS FOR DEEP BRAIN STIMULATION/ TEDS & SCD/MEDROMELSJEREMY.    TONSILLECTOMY         Social History     Socioeconomic History    Marital status:    Tobacco Use    Smoking status: Never Smoker    Smokeless tobacco: Never Used   Substance and Sexual Activity    Alcohol use: Yes     Alcohol/week: 50.0 standard drinks     Types: 50 Cans of beer per week       OBJECTIVE:     Vital Signs Range (Last 24H):         Significant Labs:  Lab Results   Component Value Date    WBC 10.61 02/02/2022    HGB 13.6 (L) 02/02/2022    HCT 39.6 (L) 02/02/2022     (L) 02/02/2022     02/02/2022    K 3.8 02/02/2022     02/02/2022    CREATININE 0.7 02/02/2022    BUN 15 02/02/2022    CO2 28 02/02/2022    INR 1.1 02/01/2022       Diagnostic Studies: No relevant studies.    EKG:   No results found for this or any previous visit.    2D ECHO:  TTE:  No results found for this or any previous visit.    GAURAV:  No results found for this or any previous visit.    ASSESSMENT/PLAN:         Pre-op Assessment    I have reviewed the Patient Summary Reports.       I have reviewed the Medications.     Review of Systems  Anesthesia Hx:  No problems with previous Anesthesia    Social:  Alcohol Use, Non-Smoker 50.0 standard drinks per week   Cardiovascular:   Hypertension CAD  CABG/stent     Pulmonary:  Pulmonary Normal    Neurological:   Neuromuscular Disease,    Psych:   anxiety depression          Physical Exam  General: Well nourished    Airway:  Mallampati: II / II  TM Distance: Normal  Tongue: Normal    Chest/Lungs:  Clear to auscultation, Normal Respiratory Rate    Heart:  Rate: Normal  Rhythm: Regular Rhythm        Anesthesia Plan  Type of  Anesthesia, risks & benefits discussed:    Anesthesia Type: Gen ETT  Intra-op Monitoring Plan: Standard ASA Monitors  Post Op Pain Control Plan: multimodal analgesia  Induction:  IV  Informed Consent: Informed consent signed with the Patient and all parties understand the risks and agree with anesthesia plan.  All questions answered.   ASA Score: 2    Ready For Surgery From Anesthesia Perspective.     .

## 2022-02-23 NOTE — H&P
Pre op H and P    Please see below clinic note from 12/13/21  Agree with findings and plan  Pt previously had DBS leads placed and returns today for elective pulse generative placement.   NPO since MN  Informed consent obtained  Proceed to OR  Recs to follow    Melo BIRMINGHAM PGY2            Richard Briceno was seen in neurosurgical consultation at the office this morning.  He is a 71-year-old man who first began to note tremor of the left upper extremity at rest about 7 years ago.  This progressed to involve the right upper extremity.  He was seen in neurological evaluation by Dr. Yan in [place] in 2015 and diagnosed with Parkinson's disease.  This has been managed with Sinemet, Requip, and Cogentin but in the last year to the medications have been less effective and he has almost constant resting tremor.  He has noted some warbling jand hypophonia of his voice.  He has experienced stiffness in the upper extremities but no specific difficulties initiating movement and he feels his gait and balance are reasonably good.  He works installing air conditioners on roof tops.  He has had a coronary artery stent placed and remains on aspirin.  He does have some intermittent shortness of breath.  Review of systems is generally otherwise unremarkable.     On physical examination he is a well-developed, stocky white male who is alert and cooperative.  Examination of the head is unremarkable.  There is a slight delay with extraocular movements but upper gaze is preserved and pupils are equal.  He is moving neck freely.  On neurological examination he is speaking appropriately but as noted has some mild tremor in the voice and hypophonia.  Understanding seems generally good.  He has marked oscillating tremor of both upper extremities at rest worse on the left side.  This is improved with finger-to-nose testing which is reasonably accurate.  There is bilateral cogwheel rigidity of the upper extremities again worse  on the left.  Gait is essentially normal.  Cranial nerves otherwise intact.  He has normal facial sensation and movement and the tongue protrudes in midline.  He shows good strength extremities, normal sensation.     Impression:  Parkinson's disease, tremor dominant.     Recommendation:  I again went over the rationale and procedure for deep brain stimulation.  Stereotaxt using MRI to accurately place the electrode and frameless stereotactic program using temporary fiducial screws in the skull to calculate the trajectory and depth to target are used.  We do microelectrode recording and intraoperative micro stimulation to assure the best placement of the DBS electrode.  He will need bilateral implants.  We will plan to start on the right side since his left extremities are more involved and then will either place the left-sided electrode at that time or as a second procedure.  We will plan to get him done the next few weeks.

## 2022-02-23 NOTE — TRANSFER OF CARE
"Anesthesia Transfer of Care Note    Patient: Richard Briceno Sr.    Procedure(s) Performed: Procedure(s) (LRB):  INSERTION, PULSE GENERATOR, DEEP BRAIN STIMULATOR (Left)    Patient location: PACU    Anesthesia Type: general    Transport from OR: Transported from OR on 100% O2 by closed face mask with adequate spontaneous ventilation    Post pain: adequate analgesia    Post assessment: no apparent anesthetic complications and tolerated procedure well    Post vital signs: stable    Level of consciousness: responds to stimulation and sedated    Nausea/Vomiting: no nausea/vomiting    Complications: none    Transfer of care protocol was followed      Last vitals:   Visit Vitals  BP (!) 190/93 (BP Location: Left leg, Patient Position: Lying)   Pulse 66   Temp 37.2 °C (98.9 °F) (Oral)   Resp 19   Ht 5' 8" (1.727 m)   Wt 90.7 kg (200 lb)   SpO2 98%   BMI 30.41 kg/m²     "

## 2022-02-23 NOTE — PROGRESS NOTES
Pt SBP >190. Anesthesia called and notified. MD aware, awaiting orders for IV labetalol. Otherwise VSS (see flowsheet).

## 2022-02-24 VITALS
SYSTOLIC BLOOD PRESSURE: 116 MMHG | HEIGHT: 68 IN | BODY MASS INDEX: 30.3 KG/M2 | HEART RATE: 82 BPM | DIASTOLIC BLOOD PRESSURE: 59 MMHG | OXYGEN SATURATION: 92 % | TEMPERATURE: 98 F | WEIGHT: 199.94 LBS | RESPIRATION RATE: 16 BRPM

## 2022-02-24 LAB
ANION GAP SERPL CALC-SCNC: 10 MMOL/L (ref 8–16)
BASOPHILS # BLD AUTO: 0.03 K/UL (ref 0–0.2)
BASOPHILS NFR BLD: 0.3 % (ref 0–1.9)
BUN SERPL-MCNC: 10 MG/DL (ref 8–23)
CALCIUM SERPL-MCNC: 8.9 MG/DL (ref 8.7–10.5)
CHLORIDE SERPL-SCNC: 101 MMOL/L (ref 95–110)
CO2 SERPL-SCNC: 28 MMOL/L (ref 23–29)
CREAT SERPL-MCNC: 0.6 MG/DL (ref 0.5–1.4)
DIFFERENTIAL METHOD: ABNORMAL
EOSINOPHIL # BLD AUTO: 0 K/UL (ref 0–0.5)
EOSINOPHIL NFR BLD: 0.1 % (ref 0–8)
ERYTHROCYTE [DISTWIDTH] IN BLOOD BY AUTOMATED COUNT: 12.2 % (ref 11.5–14.5)
EST. GFR  (AFRICAN AMERICAN): >60 ML/MIN/1.73 M^2
EST. GFR  (NON AFRICAN AMERICAN): >60 ML/MIN/1.73 M^2
GLUCOSE SERPL-MCNC: 131 MG/DL (ref 70–110)
HCT VFR BLD AUTO: 44.1 % (ref 40–54)
HGB BLD-MCNC: 14.9 G/DL (ref 14–18)
IMM GRANULOCYTES # BLD AUTO: 0.07 K/UL (ref 0–0.04)
IMM GRANULOCYTES NFR BLD AUTO: 0.7 % (ref 0–0.5)
LYMPHOCYTES # BLD AUTO: 1.5 K/UL (ref 1–4.8)
LYMPHOCYTES NFR BLD: 14.2 % (ref 18–48)
MCH RBC QN AUTO: 33.8 PG (ref 27–31)
MCHC RBC AUTO-ENTMCNC: 33.8 G/DL (ref 32–36)
MCV RBC AUTO: 100 FL (ref 82–98)
MONOCYTES # BLD AUTO: 1.4 K/UL (ref 0.3–1)
MONOCYTES NFR BLD: 13.3 % (ref 4–15)
NEUTROPHILS # BLD AUTO: 7.7 K/UL (ref 1.8–7.7)
NEUTROPHILS NFR BLD: 71.4 % (ref 38–73)
NRBC BLD-RTO: 0 /100 WBC
PLATELET # BLD AUTO: 163 K/UL (ref 150–450)
PMV BLD AUTO: 11.8 FL (ref 9.2–12.9)
POTASSIUM SERPL-SCNC: 3.9 MMOL/L (ref 3.5–5.1)
RBC # BLD AUTO: 4.41 M/UL (ref 4.6–6.2)
SODIUM SERPL-SCNC: 139 MMOL/L (ref 136–145)
WBC # BLD AUTO: 10.74 K/UL (ref 3.9–12.7)

## 2022-02-24 PROCEDURE — 85025 COMPLETE CBC W/AUTO DIFF WBC: CPT | Performed by: STUDENT IN AN ORGANIZED HEALTH CARE EDUCATION/TRAINING PROGRAM

## 2022-02-24 PROCEDURE — 99900035 HC TECH TIME PER 15 MIN (STAT)

## 2022-02-24 PROCEDURE — 25000003 PHARM REV CODE 250: Performed by: NEUROLOGICAL SURGERY

## 2022-02-24 PROCEDURE — 36415 COLL VENOUS BLD VENIPUNCTURE: CPT | Performed by: STUDENT IN AN ORGANIZED HEALTH CARE EDUCATION/TRAINING PROGRAM

## 2022-02-24 PROCEDURE — 94761 N-INVAS EAR/PLS OXIMETRY MLT: CPT

## 2022-02-24 PROCEDURE — 25000003 PHARM REV CODE 250: Performed by: STUDENT IN AN ORGANIZED HEALTH CARE EDUCATION/TRAINING PROGRAM

## 2022-02-24 PROCEDURE — 80048 BASIC METABOLIC PNL TOTAL CA: CPT | Performed by: STUDENT IN AN ORGANIZED HEALTH CARE EDUCATION/TRAINING PROGRAM

## 2022-02-24 RX ADMIN — ROPINIROLE HYDROCHLORIDE 0.5 MG: 0.25 TABLET, FILM COATED ORAL at 09:02

## 2022-02-24 RX ADMIN — CARBIDOPA AND LEVODOPA 2 TABLET: 25; 100 TABLET ORAL at 08:02

## 2022-02-24 RX ADMIN — BENZTROPINE MESYLATE 1 MG: 0.5 TABLET ORAL at 09:02

## 2022-02-24 RX ADMIN — CARBIDOPA AND LEVODOPA 1 SPLIT TABLET: 25; 100 TABLET ORAL at 03:02

## 2022-02-24 RX ADMIN — CARBIDOPA AND LEVODOPA 2 TABLET: 25; 100 TABLET ORAL at 11:02

## 2022-02-24 RX ADMIN — CARBIDOPA AND LEVODOPA 1 SPLIT TABLET: 25; 100 TABLET ORAL at 11:02

## 2022-02-24 RX ADMIN — CARBIDOPA AND LEVODOPA 2 TABLET: 25; 100 TABLET ORAL at 03:02

## 2022-02-24 RX ADMIN — CARBIDOPA AND LEVODOPA 1 SPLIT TABLET: 25; 100 TABLET ORAL at 08:02

## 2022-02-24 NOTE — DISCHARGE SUMMARY
[facility] - Neurosurgery ([facility])  Neurosurgery  Discharge Summary      Patient Name: Richard Briceno Sr.  MRN: [id number]  Admission Date: 2/23/2022  Hospital Length of Stay: 0 days  Discharge Date and Time:  02/24/2022 11:02 AM  Attending Physician: Bijan Light MD   Discharging Provider: Melo Crenshaw MD  Primary Care Provider: Primary Doctor No    HPI:   No notes on file    Procedure(s) (LRB):  INSERTION, PULSE GENERATOR, DEEP BRAIN STIMULATOR (Left)     Hospital Course: Patient was admitted for parkinsons disease on 2/23/2022 and underwent elective IPG placment on 2/23/2022. Upon closure of chest wound, pt was noted to have rapidly expanding hematoma, and the wound had to be reopened and hematoma evacuated. The underlying pectoralis muscle was found to be oozing blood, and meticulous hemostasis was achieved with bovie electrocauterie. The patient was observed overnight, and there was no recurrence of the hematoma. The patient was kept on appropriate DVT prophylaxis during the course of admission. At the time of discharge, the patient was tolerating PO intake without N/V, dysphagia, denied bowel or bladder dysfunction, denied new neurological symptoms, and reported pain controlled with current regimen. The surgical site was without evidence of drainage, breakdown or infection and all staples/sutures were intact. The patient will follow up in clinic as indicated in discharge instructions. All questions were answered and continued treatment/wound care instructions were discussed in detail prior to discharge.      Goals of Care Treatment Preferences:  Code Status: Full Code      Consults:     Significant Diagnostic Studies: Labs: All labs within the past 24 hours have been reviewed    Pending Diagnostic Studies:     None        There are no hospital problems to display for this patient.     Discharged Condition: good     Disposition: Home or Self Care    Follow Up:    Patient Instructions:      Notify your  health care provider if you experience any of the following:  temperature >100.4     Notify your health care provider if you experience any of the following:  persistent nausea and vomiting or diarrhea     Notify your health care provider if you experience any of the following:  severe uncontrolled pain     Notify your health care provider if you experience any of the following:  redness, tenderness, or signs of infection (pain, swelling, redness, odor or green/yellow discharge around incision site)     Notify your health care provider if you experience any of the following:  difficulty breathing or increased cough     Notify your health care provider if you experience any of the following:  severe persistent headache     Notify your health care provider if you experience any of the following:  worsening rash     Notify your health care provider if you experience any of the following:  persistent dizziness, light-headedness, or visual disturbances     Notify your health care provider if you experience any of the following:  increased confusion or weakness     Activity as tolerated     Medications:  Reconciled Home Medications:      Medication List      CONTINUE taking these medications    benztropine 1 MG tablet  Commonly known as: COGENTIN  Take 1 tablet by mouth 2 (two) times daily.     carbidopa-levodopa  mg  mg per tablet  Commonly known as: SINEMET  Take 2 and half tablets by mouth three times daily (8am, 12pm and 4pm)     rOPINIRole 1 MG tablet  Commonly known as: REQUIP  TAKE half a TABLET by mouth EVERY MORNING and 1 TABLET EVERY EVENING     sertraline 50 MG tablet  Commonly known as: ZOLOFT  Take 50 mg by mouth every evening.            Melo Crenshaw MD  Neurosurgery  [facility] - Neurosurgery [facility])

## 2022-02-24 NOTE — PLAN OF CARE
[facility] - Neurosurgery (Hospital)  Initial Discharge Assessment       Primary Care Provider: Primary Doctor No    Admission Diagnosis: Parkinson's disease [G20]  Parkinsons [G20]    Admission Date: 2/23/2022  Expected Discharge Date: 2/24/2022    Discharge Barriers Identified: None    Payor: MEDICARE / Plan: MEDICARE PART A & B / Product Type: Government /     Extended Emergency Contact Information  Primary Emergency Contact: Giselle Briceno  Mobile Phone: [phone]  Relation: Spouse  Preferred language: English   needed? No  Secondary Emergency Contact: Uriel Briceno  Mobile Phone: [phone]  Relation: Brother    Discharge Plan A: Home with family  Discharge Plan B: Home with family, Home Health      [facility] - [place], [place] - [address]  [address]  [place] [address]  Phone: [phone] Fax: [phone]      Initial Assessment (most recent)     Adult Discharge Assessment - 02/24/22 1123        Discharge Assessment    Assessment Type Discharge Planning Assessment     Confirmed/corrected address, phone number and insurance Yes     Confirmed Demographics Correct on Facesheet     Source of Information patient     Communicated PILY with patient/caregiver Yes     Reason For Admission generator     Lives With spouse     Do you expect to return to your current living situation? Yes     Do you have help at home or someone to help you manage your care at home? Yes     Who are your caregiver(s) and their phone number(s)? Giselle - spouse [phone]     Prior to hospitilization cognitive status: Alert/Oriented     Current cognitive status: Alert/Oriented     Walking or Climbing Stairs Difficulty none     Dressing/Bathing Difficulty none     Equipment Currently Used at Home none     Readmission within 30 days? No     Patient currently being followed by outpatient case management? Unable to determine (comments)     Do you currently have service(s) that help you manage your care at home? No     Do  you take prescription medications? Yes     Do you have prescription coverage? Yes     Coverage MEDICARE - MEDICARE PART A & B     Do you have any problems affording any of your prescribed medications? No     Is the patient taking medications as prescribed? yes     Who is going to help you get home at discharge? family     How do you get to doctors appointments? family or friend will provide     Are you on dialysis? No     Do you take coumadin? No     Discharge Plan A Home with family     Discharge Plan B Home with family;Home Health     DME Needed Upon Discharge  none     Discharge Plan discussed with: Patient     Discharge Barriers Identified None        Relationship/Environment    Name(s) of Who Lives With Patient Giselle

## 2022-02-24 NOTE — PLAN OF CARE
Problem: Adult Inpatient Plan of Care  Goal: Plan of Care Review  2/24/2022 0519 by Karina Benitez LPN  Outcome: Ongoing, Progressing  Flowsheets (Taken 2/24/2022 0519)  Plan of Care Reviewed With: patient  2/24/2022 0519 by Karina Benitez LPN  Outcome: Ongoing, Progressing  Goal: Absence of Hospital-Acquired Illness or Injury  Outcome: Ongoing, Progressing  Goal: Optimal Comfort and Wellbeing  Outcome: Ongoing, Progressing     Problem: Fall Injury Risk  Goal: Absence of Fall and Fall-Related Injury  Outcome: Ongoing, Progressing       POC reviewed and updated with the patient/caregiver. Questions regarding POC were encouraged and addressed with the patient/caregiver.  VSS, see flow-sheets. Patient is AO X 4 at this time. Fall/safety precautions maintained, no signs of injury noted during shift. Patient was repositioned for comfort, bed locked in low position with side rails X 2, bed alarm set, with call light within reach. Patient instructed to call staff for mobility, verbalized understanding.  No acute signs of distress noted at this time.

## 2022-02-24 NOTE — BRIEF OP NOTE
[facility] - Surgery ([facility])  Brief Operative Note    SUMMARY     Surgery Date: 2/23/2022     Surgeon(s) and Role:     * Bijan Light MD - Primary     * Melo Crenshaw MD - Resident - Assisting        Pre-op Diagnosis:  Parkinson's disease [G20]    Post-op Diagnosis:  Post-Op Diagnosis Codes:     * Parkinson's disease [G20]    Procedure(s) (LRB):  INSERTION, PULSE GENERATOR, DEEP BRAIN STIMULATOR (Left)    Anesthesia: General    Operative Findings: Good placement of stimulator leads and generator. Upon closure, pt was noted to have a hematoma of the left chest at the site of the generator. The patient was reprepped and draped and the chest incision was open and the underlying pectoralis muscle was found to be oozing blood, likely the reason for the hematoma formation. Meticulous hemostasis was achieved using bovie electrocauterie, the generator was replaced, and the wound was closed with no further complication.     -- Patient will be admitted overnight for observation of hematoma    Estimated Blood Loss: * No values recorded between 2/23/2022  2:25 PM and 2/23/2022  4:45 PM *    Estimated Blood Loss has been documented.         Specimens:   Specimen (24h ago, onward)            None          [id number]

## 2022-02-24 NOTE — ANESTHESIA POSTPROCEDURE EVALUATION
Anesthesia Post Evaluation    Patient: Richard Briceno Sr.    Procedure(s) Performed: Procedure(s) (LRB):  INSERTION, PULSE GENERATOR, DEEP BRAIN STIMULATOR (Left)    Final Anesthesia Type: general      Patient location during evaluation: PACU  Patient participation: Yes- Able to Participate  Level of consciousness: awake and alert and oriented  Post-procedure vital signs: reviewed and stable  Pain management: adequate  Airway patency: patent    PONV status at discharge: No PONV  Anesthetic complications: no      Cardiovascular status: stable  Respiratory status: unassisted  Hydration status: euvolemic  Follow-up not needed.          Vitals Value Taken Time   /81 02/24/22 0811   Temp 36.8 °C (98.2 °F) 02/24/22 0741   Pulse 60 02/24/22 0811   Resp 17 02/24/22 0811   SpO2 94 % 02/24/22 0811   Vitals shown include unvalidated device data.      Event Time   Out of Recovery 17:30:00         Pain/Bharath Score: Bharath Score: 10 (2/23/2022  6:30 PM)

## 2022-02-24 NOTE — PLAN OF CARE
AVS reviewed w/ patient. Patient verbalized understanding of education. All comments and concerns addressed. PIV x 1 removed. VSS at discharge. Pt transported via wheelchair. All belongings sent with pt via private vehicle. JENNY.

## 2022-02-24 NOTE — OP NOTE
Date of Operation:  2/23/2022.    Preoperative Diagnosis:  Parkinson's disease, tremor dominant.    Postoperative Diagnosis:  Parkinson's disease, tremor dominant.    Procedure:  Implantation of pulse generator for deep brain stimulation (Percept, Sessions)    Surgeon:  Bijan Light MD    Assistant:  Melo Dhaliwal MD (Resident in Neurosurgery)    Anesthesia:  General Endotracheal.    Estimated Blood Loss:  100 ml.    Condition at End of Procedure: Satisfactory.    Brief History:  This 71-year-old man with tremor dominant Parkinson's disease underwent bilateral implantation of deep brain stimulating electrodes in the VIM thalamic nuclei on 02/01/2022.  He returned on 02/08/2022 for insertion of the pulse generator but tested positive for COVID-19 and the procedure had to be delayed.  He now returns to have the generator implanted.    Procedure in Detail:  The patient was brought to the operating room and in the supine position under premedication on the operating table intubated and induced with general anesthesia.  Sequential compression devices were applied to the legs and various intravenous line started.  The head was turned to the right and allowed to rest on a doughnut.  The table was placed in reverse Trendelenburg to take pressure out of the jugular veins.  The hair behind the left ear and on the neck and left upper chest were shaved and this area prepped with Betadine and draped in a sterile fashion.  A small curvilinear incision above his left ear and a transverse incision 3 fingerbreadths below the clavicle were outlined and these were injected with 1% xylocaine and epinephrine.  Operation was begun at the scalp.  The incision was carried through the skin and galea with the plasma blade and bleeding controlled with the plasma blade.  The scalp was dissected in the subgaleal plane and the 2 buried DBS electrodes with their lead kits brought out of the wound and covered with an antibiotic-soaked sponge.   Attention was then turned to the chest again the skin was opened with the plasma blade and a subcutaneous pocket created with the plasma blade and blunt dissection inferiorly and up toward the clavicle and the deltopectoral fascia was opened with a hemostat.  The retromastoid fascia was also opened with a hemostat.  It was noted that there was some oozing from the pectoralis muscle and this was coagulated with the plasma blade.  The shunt passer was then brought down from the scalp to the chest and 2 connecting leads brought up into the scalp wound.  The one destined to be the left side is marked with a dot.  The right DBS electrode has a suture on the tip of the lead kit.  The lead kit was removed from the left DBS electrode it was inserted into the connecting lead and the screw tightened with a torque wrench.  The lead right DBS electrode was inserted the same way into the appropriate connecting lead.  The ends of the connecting leads were inserted into a Percept dual channel generator with the left side superior and the right-sided inferior and the screws tightened with a torque wrench also.  The generator was placed into the subcutaneous pocket on the chest and affixed to the underlying muscle with 3-0 silk sutures.  Some additional oozing from the pectoralis muscle was coagulated and some Surgiflo was placed around the generator.  The subcutaneous tissue was then closed in 2 layers a deeper simple and more superficial inverted interrupted 3-0 Vicryl sutures and the skin closed with a 4-0 Monocryl suture half-inch Steri-Strips and Mastisol and covered with Telfa and Tegaderm.  The scalp incision was closed with inverted interrupted 3-0 Vicryl sutures and the skin closed with skin staples.  The wound was covered with Telfa bacitracin dressing held in place with tape.  The patient was awakened from anesthesia but was pushing very hard against the endotracheal tube.  It was then noted that he was developing a  subcutaneous hematoma around the pulse generator and up toward the neck.  The dressings were removed and the chest re-prepped with Betadine and draped in a sterile fashion.  The wound was opened, the generator removed from the subcutaneous pocket and blood clot evacuated with irrigation and suction.  Bleeding seemed to be coming from the lowest portion of the exposed pectoralis muscle on the right side and this was all carefully coagulated.  Thorough irrigation showed no additional bleeding.  The generator was then replaced with 3-0 silk sutures.  Testing showed normal impedance and all channels.  The skin was closed the same way 2 layers in the subcutaneous tissue and a Monocryl subcuticular suture in the skin.  The Steri-Strips were reapplied and the wound covered with Telfa Tegaderm.  The wound was washed and there was no evidence for recurrent hematoma.  The patient was then extubated under deeper sedation, transferred to his gurney, and returned to the outpatient recovery area in satisfactory condition.  We will plan to keep him overnight to be sure there is no recurrence of the hematoma.  He received 2 g of Rocephin at the beginning of the procedure and bacitracin containing antibiotic irrigating solutions were used throughout.

## 2022-02-24 NOTE — NURSING TRANSFER
Nursing Transfer Note      2/23/2022     Reason patient is being transferred: to room post recovery    Transfer     Transfer via stretcher    Transfer with belongings    Transported by PCT    Medicines sent: N/A    Any special needs or follow-up needed: N/A    Chart send with patient: Yes    Notified: family    Patient reassessed at: 2/23/22 @ 1850    Upon arrival to floor: HA Miller on NPU

## 2022-02-24 NOTE — PLAN OF CARE
PT: Cancel x2 attempts today, pt with increased anxiety, work of breathing requiring medication and sedated/sleepy after. PT will reschedule to tomorrow.   [facility] - Neurosurgery (Hospital)  Discharge Final Note    Primary Care Provider: Primary Doctor No    Expected Discharge Date: 2/24/2022     Patient to be discharged home.  The patient does not have any home needs.  Family to provide transportation home.  Neurosurgery clinic to schedule follow up appointment.    Future Appointments   Date Time Provider Department Center   3/4/2022 12:00 PM Elysia Vargas MD [facility] [id number]       Final Discharge Note (most recent)     Final Note - 02/24/22 1128        Final Note    Assessment Type Final Discharge Note        Post-Acute Status    Post-Acute Authorization Other     Other Status No Post-Acute Service Needs     Discharge Delays None known at this time                 Important Message from Medicare

## 2022-02-24 NOTE — DISCHARGE INSTRUCTIONS
--Patient stable for discharge to home    --Please take prescriptions as detailed in medication list    --All questions/concerns addressed and answered    --Please followup with neurosurgery clinic in 4 weeks; to be arranged by Neurosurgery Clinic     --Please call immediately for any new onset nausea/vomiting/fever/chills, wound breakdown, numbness/tingling/weakness    Wound Care Instructions:  -If you have any dressings at discharge, please remove 48 hours after the surgery.  -If you have steri strips, do not remove, as they will fall off.  -If you have staples, do not remove, as they will be removed at clinic follow up.  -You may shower daily but do not soak or submerge wound in water.  -Scalp/head incisions, wash hair daily with baby shampoo and do not use hair products. Pat incision dry, do not rub.  -For head incisions, do not wear scarfs or hats.  -Keep all wounds clean, dry, and open to air.  -Do not apply creams or ointments to the wound.  -No driving while on narcotic pain medications  -Call Neurosurgery if the wound opens, drains, or becomes red

## 2022-02-24 NOTE — HOSPITAL COURSE
Patient was admitted for parkinsons disease on 2/23/2022 and underwent elective IPG placment on 2/23/2022. Upon closure of chest wound, pt was noted to have rapidly expanding hematoma, and the wound had to be reopened and hematoma evacuated. The underlying pectoralis muscle was found to be oozing blood, and meticulous hemostasis was achieved with bovie electrocauterie. The patient was observed overnight, and there was no recurrence of the hematoma. The patient was kept on appropriate DVT prophylaxis during the course of admission. At the time of discharge, the patient was tolerating PO intake without N/V, dysphagia, denied bowel or bladder dysfunction, denied new neurological symptoms, and reported pain controlled with current regimen. The surgical site was without evidence of drainage, breakdown or infection and all staples/sutures were intact. The patient will follow up in clinic as indicated in discharge instructions. All questions were answered and continued treatment/wound care instructions were discussed in detail prior to discharge.

## 2022-03-04 ENCOUNTER — OFFICE VISIT (OUTPATIENT)
Dept: NEUROLOGY | Facility: CLINIC | Age: 72
End: 2022-03-04
Payer: MEDICARE

## 2022-03-04 ENCOUNTER — OFFICE VISIT (OUTPATIENT)
Dept: NEUROSURGERY | Facility: CLINIC | Age: 72
End: 2022-03-04
Payer: MEDICARE

## 2022-03-04 VITALS — HEIGHT: 68 IN | BODY MASS INDEX: 30.16 KG/M2 | WEIGHT: 199 LBS

## 2022-03-04 DIAGNOSIS — G20.A1 PARKINSON'S DISEASE: Primary | ICD-10-CM

## 2022-03-04 DIAGNOSIS — G20.A1 PARKINSON'S DISEASE: ICD-10-CM

## 2022-03-04 PROCEDURE — 99024 POSTOP FOLLOW-UP VISIT: CPT | Mod: POP,,, | Performed by: NEUROLOGICAL SURGERY

## 2022-03-04 PROCEDURE — 95984 PR ELEC ANALYSIS, IMPLT NEURO PULSE GEN, W/PRGRM, BRAIN,  EA ADDTL 15 MINS: ICD-10-PCS | Mod: S$PBB,,, | Performed by: PSYCHIATRY & NEUROLOGY

## 2022-03-04 PROCEDURE — 99213 PR OFFICE/OUTPT VISIT, EST, LEVL III, 20-29 MIN: ICD-10-PCS | Mod: 25,S$PBB,, | Performed by: PSYCHIATRY & NEUROLOGY

## 2022-03-04 PROCEDURE — 95983 ALYS BRN NPGT PRGRMG 15 MIN: CPT | Mod: PBBFAC | Performed by: PSYCHIATRY & NEUROLOGY

## 2022-03-04 PROCEDURE — 99999 PR PBB SHADOW E&M-EST. PATIENT-LVL III: CPT | Mod: PBBFAC,,, | Performed by: NEUROLOGICAL SURGERY

## 2022-03-04 PROCEDURE — 95983 ALYS BRN NPGT PRGRMG 15 MIN: CPT | Mod: S$PBB,,, | Performed by: PSYCHIATRY & NEUROLOGY

## 2022-03-04 PROCEDURE — 99213 OFFICE O/P EST LOW 20 MIN: CPT | Mod: 25,S$PBB,, | Performed by: PSYCHIATRY & NEUROLOGY

## 2022-03-04 PROCEDURE — 99213 OFFICE O/P EST LOW 20 MIN: CPT | Mod: PBBFAC,25 | Performed by: NEUROLOGICAL SURGERY

## 2022-03-04 PROCEDURE — 99024 PR POST-OP FOLLOW-UP VISIT: ICD-10-PCS | Mod: POP,,, | Performed by: NEUROLOGICAL SURGERY

## 2022-03-04 PROCEDURE — 95984 ALYS BRN NPGT PRGRMG ADDL 15: CPT | Mod: PBBFAC | Performed by: PSYCHIATRY & NEUROLOGY

## 2022-03-04 PROCEDURE — 99999 PR PBB SHADOW E&M-EST. PATIENT-LVL III: ICD-10-PCS | Mod: PBBFAC,,, | Performed by: NEUROLOGICAL SURGERY

## 2022-03-04 PROCEDURE — 95983 PR ELEC ANALYSIS, IMPLT NEURO PULSE GEN, W/PRGRM, BRAIN, 1ST 15 MINS: ICD-10-PCS | Mod: S$PBB,,, | Performed by: PSYCHIATRY & NEUROLOGY

## 2022-03-04 PROCEDURE — 95984 ALYS BRN NPGT PRGRMG ADDL 15: CPT | Mod: S$PBB,,, | Performed by: PSYCHIATRY & NEUROLOGY

## 2022-03-04 RX ORDER — SILDENAFIL CITRATE 20 MG/1
TABLET ORAL
COMMUNITY
Start: 2021-12-09

## 2022-03-04 RX ORDER — CLONAZEPAM 1 MG/1
1 TABLET ORAL 2 TIMES DAILY PRN
COMMUNITY
Start: 2022-01-22

## 2022-03-04 RX ORDER — MULTIVITAMIN
TABLET ORAL
COMMUNITY

## 2022-03-04 RX ORDER — LISINOPRIL AND HYDROCHLOROTHIAZIDE 12.5; 2 MG/1; MG/1
1 TABLET ORAL DAILY
COMMUNITY
Start: 2021-09-02 | End: 2022-09-02

## 2022-03-04 RX ORDER — GARLIC 100 MG
TABLET ORAL
COMMUNITY

## 2022-03-04 NOTE — PROGRESS NOTES
"MOVEMENT DISORD[facility]    PCP/Referring Provider: No referring provider defined for this encounter.  Date of Service: 3/4/2022    Chief Complaint: iPD    Interval Hx    Since last visit,   Had B/L VIM DBS  No issues post OP  Takes carbidopa/levodopa 25/100mg 2.5tab PO TID  Benztrope 1mg BID   Roprinirole 1mg Po BID    Severe b/l tremoring continues    "priorHPI: Richard Briceno Sr. is a R HANDED 71 y.o. male with a medical issues significant forCAD with stenting,  tremor predominate iPD coming from [facility] for DBS eval. Started with L ahnd tremor at age 66. This progressed to R hand at age 69. 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   No fam hx PD    Takes carbidopa/levodopa 25/100mg 2.5tab PO TID  Benztrope 1mg BID   Roprinirole 1mg Po BID      Neuroleptic exposure:  -None    PD Review of Symptoms:  Anosmia: mildly blunted  Dysarthria/Hypophonia: mild  Dysphagia/Sialorrhea: none  Depression: reports down oncer in a while - on zoloft 50mg  Cognitive slowing: Mild issues per family  Hallucinations: sees bugs at times  Constipation: none  Orthostasis: none  Falls: rare  Micrographia: yes  Sleep issues:  -RBD: yes    Review of Systems:   Review of Systems   Constitutional: Negative for fever.   HENT: Negative for congestion.    Eyes: Negative for double vision.   Respiratory: Negative for cough and shortness of breath.    Cardiovascular: Negative for chest pain and leg swelling.   Gastrointestinal: Negative for nausea.   Genitourinary: Negative for dysuria.   Musculoskeletal: Negative for falls.   Skin: Negative for rash.   Neurological: Positive for tremors and speech change. Negative for headaches.   Psychiatric/Behavioral: Positive for memory loss. Negative for depression.         Current Medications:  Outpatient Encounter Medications as of 3/4/2022   Medication Sig Dispense Refill    benztropine (COGENTIN) 1 MG tablet Take 1 tablet by mouth 2 (two) times daily.      " carbidopa-levodopa  mg (SINEMET)  mg per tablet Take 2 and half tablets by mouth three times daily (8am, 12pm and 4pm)      rOPINIRole (REQUIP) 1 MG tablet TAKE half a TABLET by mouth EVERY MORNING and 1 TABLET EVERY EVENING      sertraline (ZOLOFT) 50 MG tablet Take 50 mg by mouth every evening.       No facility-administered encounter medications on file as of 3/4/2022.       Past Medical History:  Patient Active Problem List   Diagnosis    Parkinson's disease    Mild cognitive impairment    Anxiety    Depression       Past Surgical History:  Past Surgical History:   Procedure Laterality Date    CORONARY STENT PLACEMENT  07/2020    INSERTION OF DEEP BRAIN STIMULATOR GENERATOR Left 2/23/2022    Procedure: INSERTION, PULSE GENERATOR, DEEP BRAIN STIMULATOR;  Surgeon: Bijan Light MD;  Location: [facility] OR [address] [place] [address];  Service: Neurosurgery;  Laterality: Left;  INSERT PULSE GENERATOR FOR DEEP BRAIN STIMULATION/ LEFT SIDE/  TEDS & SCD/ [facility]. (PRE-CERT: CASE ORIGINALLY SCHED FOR 2/8/22 WAS CXLD DUE TO PT TESTING ++ FOR COVID THAT AM.)    INSERTION, DEEP BRAIN STIMULATOR, LEAD ONLY Bilateral 2/1/2022    Procedure: INSERTION,DEEP BRAIN STIMULATOR,LEAD ONLY;  Surgeon: Bijan Light MD;  Location: [facility] OR [address] [place] [address];  Service: Neurosurgery;  Laterality: Bilateral;  INSERTION OF ELECTRODES FOR BILATERAL DEEP BRAIN STIMULATION/ RIGHT SIDE FIRST, LEFT SIDE SECOND. TEDS & SCD, [facility].    PLACEMENT OF FIDUCIAL SCREW INTO SPINE N/A 2/1/2022    Procedure: INSERTION, FIDUCIAL SCREW, SKULL FOR DBD;  Surgeon: Bijan Light MD;  Location: [facility] OR [address] [place] [address];  Service: Neurosurgery;  Laterality: N/A;  APPLY FIDUCIALS FOR DEEP BRAIN STIMULATION/ TEDS & SCD/[facility].    TONSILLECTOMY         Social:  Social History     Socioeconomic History    Marital status:    Tobacco Use    Smoking status: Never Smoker    Smokeless tobacco: Never Used   Substance and Sexual  "Activity    Alcohol use: Yes     Alcohol/week: 50.0 standard drinks     Types: 50 Cans of beer per week    Drug use: Never       Family History:  Family History   Problem Relation Age of Onset    Heart disease Mother     Diabetes Mother     Cancer Mother        PHYSICAL:  There were no vitals taken for this visit.    General Medical Examination:  General: Good hygiene, appropriate appearance.  HEENT: Normocephalic, atraumatic.   Neck: Supple.   Chest: Unlabored breathing.   CV: Symmetric pulses.   Ext: No clubbing, cyanosis, or edema.     Mental Status:  Mood/Affect: Appropriate/congruent.  Level of consciousness: Awake, alert.  Orientation: Oriented to person, place, time and situation.  Language: No Dysarthria    Cranial nerves:  I: Not tested  II: PERRL, VFF to counting  III, IV, VI: EOMI with conjugate gaze and no nystagmus on end gaze - mild choppy saccades  V: Facial sensation intact and symmetric over the bilateral V1-V3  VII: Facial muscle activation intact and symmetric over the bilateral upper and lower face  VIII: Hearing intact in the b/l ears and symmetrical to finger rub  IX, X, XII: TUP midline - no atrophy or fasiculations  X: SCMs and shoulder shrug full strength b/l and symmetric    Mod hypomimia  Mod hypophonuia    ON/OFF  Rating Scale  ___________________________________________________      MOTOR EXAMINATION (ON)       Speech  1 - Slight loss of expression, dictation, and/or volumn.   Facial Expression  1 - Minimal Hypomimia, could be normal "Poker Face".   Tremor at Rest:      Face, lips, chin 0 - Absent.    Hands:      right 2 - Moderate in amplitude and present most of the time.   left 3 - Moderate in amplitude and present most of the time.   Feet:      right 0 - Absent.    left 1 - Slight and infrequently present.    Action or Postural Tremor of Hands      right 1 - Slight; present with action.   left 0 - Absent.    Rigidity      Neck 1 - Slight or detectable only when activated by " mirror or other movements.   Upper Extremity: Right 1 - Slight or detectable only when activated by mirror or other movements.   Upper Extremity: Left 2 - Mild or moderate.   Lower Extremity: Right 0 - Absent.   Lower Extremity: Left 0 - Absent.   Finger Taps      right 0 - Normal.   left 1 - Mild slowing and/or reduction in amplitude.   Hand Movements      right 1 - Mild slowing and/or reduction in amplitude.   left 2 - Moderately impaired. Definite and early fatiguing. May have occasional arrests in movement.   Rapid Alternating Movements of Hands      right 0 - Normal.   left 0 - Normal.   Leg Agility      right 0 - Normal.   left 0 - Normal.   Arising from Chair  0 - Normal.   Posture  1 - Not quite erect, slightly stooped posture; could be normal for older person.   Gait  0 - Normal.   Postural Stability (Response to sudden, strong posterior displacement produced by pull on shoulders while patient erect with eyes open and feet slightly apart. Patient is prepared, and can have had some practice runs.)  0 - Normal.   Body Bradykinesia and Hypokinesia (Combining slowness, hesitancy, decreased armswing, small amplitude, and poverty of movement in general)  0 - None.     _______________________________________  Procedure:  Electrode type: ; Neurostimulator type: [employer] PC    Battery information: status ok.   Electrode impedance check:  Left and Right hemisphere: No electrodes out of range at 1mA    LEFT  Pulse width set at 60; Frequency set at 120  left VIM Monopolar review  C&3 @1mA   no parasthesias -poor tremor control  C&2 @1mA no parasthesias -good tremor control  C&1 @1mA fleeting parasthesias best tremor control  C&0 @1mA fleeting parasthesias    @1.5mA fleeting paraesthesias great tremor control    RIGHT  Pulse width set at 60; Frequency set at 120  right VIM Monopolar review  C&3 @2mA no change  C&2 @2mA- fleeting parasthesias minor tremor control  C&1 @1mA- no parasthesias   @2.3mA best tremor  control  C&0 @1mA- fleeting parasthesias   @2mA- fleeting parasthesias great tremor control    Final settings:  left VIM, C positive, 1 negative.  Amplitude 1.1 mA  Pulse width 60 microseconds  Rate 120 hertz    right VIM, C positive, 1 negative.  Amplitude 2.3 mA  Pulse width 60 microseconds  Rate 120 hertz      Laboratory Data:  NA    Imaging:  NA    Assessment//Plan:   Problem List Items Addressed This Visit        Neuro    Parkinson's disease    Current Assessment & Plan     Typical tremor predominate PD  Today s/p B/L VIM DBS  Marked improvement to b/l pillrolling tremor  We taught him and brother how to use his personal  to turn his IPG ON and OFF  Continue his current P regimen                 I spent 60 minutes programmign DBS    Elysia Vargas MD, MS  [facility]  Department of Neurology  Movement Disorders

## 2022-03-04 NOTE — PROGRESS NOTES
Richard Briceno was seen in neurosurgical follow-up at the office today.  He had bilateral implantation of deep brain stimulating electrodes in the VIM thalamic nuclei on 02/01/2022.  This went well and he returned on 02/08/2022 for insertion of the pulse generator.  However he tested positive for COVID and the procedure needed to be delayed for quarantine.  He then returned on 02/24/2022 and had the pulse generator inserted and connected to the DBS electrodes.  He was programmed by Dr. Vargas earlier today and seems to be having a good result from his implants.    On brief examination today he is alert and cooperative.  The original staples were removed when he had the pulse generator inserted and these wounds have gone on to good healing.  The staples behind his ear over the connecting leads were removed today.  The Steri-Strips are still in place on his chest.  He can wash these off in the shower.  He shows only minimal tremor of the outstretched extremities today and seems to have some improvement in rigidity.  I will follow him in the Movement Disorders Clinic.

## 2022-03-04 NOTE — ASSESSMENT & PLAN NOTE
Typical tremor predominate PD  Today s/p B/L VIM DBS  Marked improvement to b/l pillrolling tremor  We taught him and brother how to use his personal  to turn his IPG ON and OFF  Continue his current P regimen

## 2022-08-24 ENCOUNTER — TELEPHONE (OUTPATIENT)
Dept: NEUROLOGY | Facility: CLINIC | Age: 72
End: 2022-08-24

## 2022-08-24 NOTE — TELEPHONE ENCOUNTER
----- Message from Jason Sarkar sent at 8/24/2022  2:14 PM CDT -----  Contact: @[phone]  Caller wife ( Giselle) is calling in stating that the pt appt may not be neccessary and is requesting a call back. Please call to discuss further.

## 2022-11-20 NOTE — ANESTHESIA PROCEDURE NOTES
11/20/22 0233   Settings   FiO2 (%) Analyzed 40 %   Vent Mode Pressure A/C   Resp Rate (Set) 20   PEEP/CPAP/EPAP 6 cm H20   Pressure Control (cm H2O) 18 cmH2O   [REMOVED] Non-Surgical Airway 11/17/22 Oral ETT 7.5 Cuffed   Removal Date/Time: 11/19/22 1859  Placement Date/Time: 11/17/22 0912   Inserted By: Dr Gtz  Airway Device: Oral ETT  ETT Size (mm): 7.5  Type: Cuffed  Intubation Device: MAC  Attempts: 1   Cuff Status Cuffed   Securement Other (comment)  (holister)   Secured at (cm) 22 cm   Measured From Teeth   Secured Location Right     Minimal secretions this past shift. No weaning this shift    Intubation    Date/Time: 2/23/2022 2:02 PM  Performed by: Serena Sharif MD  Authorized by: Fay Gil MD     Intubation:     Induction:  Intravenous    Intubated:  Postinduction    Mask Ventilation:  Easy mask    Attempts:  1    Attempted By:  Resident anesthesiologist    Method of Intubation:  Video laryngoscopy    Blade:  [place] 3    Laryngeal View Grade: Grade I - full view of cords      Difficult Airway Encountered?: No      Complications:  None    Airway Device:  Oral endotracheal tube    Airway Device Size:  7.5    Style/Cuff Inflation:  Cuffed    Tube secured:  23    Secured at:  The lips    Placement Verified By:  Capnometry    Complicating Factors:  None    Findings Post-Intubation:  BS equal bilateral

## (undated) DEVICE — BUR BONE CUT MICRO TPS 3X3.8MM

## (undated) DEVICE — Device

## (undated) DEVICE — SEE MEDLINE ITEM 156905

## (undated) DEVICE — DRAPE STERI INSTRUMENT 1018

## (undated) DEVICE — CLOSURE SKIN STERI STRIP 1/2X4

## (undated) DEVICE — CORD BIPOLAR 12 FOOT

## (undated) DEVICE — BILATERAL NEXFRAME KIT

## (undated) DEVICE — PROGRAMMER

## (undated) DEVICE — BATTERY AUTODRIVE TURBO

## (undated) DEVICE — MARKER SKIN STND TIP BLUE BARR

## (undated) DEVICE — DRAPE INCISE IOBAN 2 23X17IN

## (undated) DEVICE — DIFFUSER

## (undated) DEVICE — ELECTRODE REM PLYHSV RETURN 9

## (undated) DEVICE — SPONGE DERMACEA GAUZE 4X4

## (undated) DEVICE — TUBE FRAZIER 5MM 2FT SOFT TIP

## (undated) DEVICE — DRAPE THYROID WITH ARMBOARD

## (undated) DEVICE — DRESSING TELFA N ADH 3X8

## (undated) DEVICE — SUT MONOCRYL 4-0 PS-1 UND

## (undated) DEVICE — ADHESIVE MASTISOL VIAL 48/BX

## (undated) DEVICE — BIT PERFORATOR LARGE

## (undated) DEVICE — SOL 9P NACL IRR PIC IL

## (undated) DEVICE — SUT VICRYL PLUS 3-0 SH 18IN

## (undated) DEVICE — CLIPPER CHARGE BASE ONLY 5514A

## (undated) DEVICE — TAPE MEDIPORE 4IN X 2YDS

## (undated) DEVICE — SKIN MARKERS DEROYAL

## (undated) DEVICE — SUT ETHILON 3-0 PS2 18 BLK

## (undated) DEVICE — RUBBERBAND STERILE 3X1/8IN

## (undated) DEVICE — NEXPROBE

## (undated) DEVICE — GUIDE ENDOCAVITY NEEDLE 3.5X20

## (undated) DEVICE — TUNNELING TOOL

## (undated) DEVICE — SEE MEDLINE ITEM 157131

## (undated) DEVICE — SEE MEDLINE ITEM 154981

## (undated) DEVICE — DRAPE IOBAN 2 STERI

## (undated) DEVICE — DRESSING TRANS 4X4 TEGADERM

## (undated) DEVICE — BIT DRILL PERFORATOR DISP 14MM

## (undated) DEVICE — STAPLER SKIN PROXIMATE WIDE

## (undated) DEVICE — SUT VICRYL PLUS 4-0 P3 18IN

## (undated) DEVICE — DRESSING SURGICAL 1/2X1/2

## (undated) DEVICE — BLADE SURG #15 CARBON STEEL

## (undated) DEVICE — HEMOSTAT SURGICEL 4X8IN

## (undated) DEVICE — NDL 18GA X1 1/2 REG BEVEL

## (undated) DEVICE — SUT 2-0 12-18IN SILK

## (undated) DEVICE — KIT SURGIFLO HEMOSTATIC MATRIX

## (undated) DEVICE — FIDUCIAL KIT UNI STEREO 10 MM
Type: IMPLANTABLE DEVICE | Site: CRANIAL | Status: NON-FUNCTIONAL
Removed: 2022-02-01

## (undated) DEVICE — CONTAINER SPECIMEN STRL 4OZ

## (undated) DEVICE — TAPE SURG MEDIPORE 6X72IN

## (undated) DEVICE — REMOVER STAPLE SKIN STERILE

## (undated) DEVICE — GAUZE SPONGE 4X4 12PLY

## (undated) DEVICE — KIT ACC ALLIGATOR CLIP CABLE

## (undated) DEVICE — TRAY FOLEY 16FR INFECTION CONT

## (undated) DEVICE — SPRAY MASTISOL

## (undated) DEVICE — TOWEL OR DISP STRL BLUE 4/PK

## (undated) DEVICE — BOOT SUTURE AID

## (undated) DEVICE — DRAPE CORETEMP FLD WRM 56X62IN

## (undated) DEVICE — SYR 10CC LUER LOCK

## (undated) DEVICE — SUT CTD VICRYL CR/RB-1 4-0

## (undated) DEVICE — SUT SILK 3-0 CR/RB-1 8-18

## (undated) DEVICE — ELECTRODE MICRO TARGARETING

## (undated) DEVICE — SUT 2/0 18IN SILK BLK BRAID

## (undated) DEVICE — CARTRIDGE OIL

## (undated) DEVICE — CLIPPER BLADE MOD 4406 (CAREF)